# Patient Record
Sex: FEMALE | Race: WHITE | NOT HISPANIC OR LATINO | Employment: FULL TIME | URBAN - NONMETROPOLITAN AREA
[De-identification: names, ages, dates, MRNs, and addresses within clinical notes are randomized per-mention and may not be internally consistent; named-entity substitution may affect disease eponyms.]

---

## 2017-08-22 ENCOUNTER — APPOINTMENT (OUTPATIENT)
Dept: CT IMAGING | Facility: HOSPITAL | Age: 32
End: 2017-08-22

## 2017-08-22 ENCOUNTER — HOSPITAL ENCOUNTER (EMERGENCY)
Facility: HOSPITAL | Age: 32
Discharge: HOME | End: 2017-08-22
Attending: EMERGENCY MEDICINE | Admitting: EMERGENCY MEDICINE

## 2017-08-22 VITALS
WEIGHT: 150 LBS | DIASTOLIC BLOOD PRESSURE: 76 MMHG | OXYGEN SATURATION: 99 % | RESPIRATION RATE: 18 BRPM | HEART RATE: 68 BPM | BODY MASS INDEX: 22.22 KG/M2 | SYSTOLIC BLOOD PRESSURE: 118 MMHG | HEIGHT: 69 IN | TEMPERATURE: 98 F

## 2017-08-22 DIAGNOSIS — R10.31 GROIN PAIN, RIGHT: Primary | ICD-10-CM

## 2017-08-22 LAB
B-HCG UR QL: NEGATIVE
BILIRUB UR QL STRIP: NEGATIVE
CLARITY UR: ABNORMAL
COLOR UR: YELLOW
GLUCOSE UR STRIP-MCNC: NEGATIVE MG/DL
HGB UR QL STRIP.AUTO: NEGATIVE
KETONES UR QL STRIP: NEGATIVE
LEUKOCYTE ESTERASE UR QL STRIP.AUTO: NEGATIVE
NITRITE UR QL STRIP: NEGATIVE
PH UR STRIP.AUTO: 8 [PH] (ref 5–8)
PROT UR QL STRIP: NEGATIVE
SP GR UR STRIP: 1.02 (ref 1–1.03)
UROBILINOGEN UR QL STRIP: ABNORMAL

## 2017-08-22 PROCEDURE — 74176 CT ABD & PELVIS W/O CONTRAST: CPT | Performed by: RADIOLOGY

## 2017-08-22 PROCEDURE — 81003 URINALYSIS AUTO W/O SCOPE: CPT | Performed by: PHYSICIAN ASSISTANT

## 2017-08-22 PROCEDURE — 74176 CT ABD & PELVIS W/O CONTRAST: CPT

## 2017-08-22 PROCEDURE — 99283 EMERGENCY DEPT VISIT LOW MDM: CPT

## 2017-08-22 PROCEDURE — 81025 URINE PREGNANCY TEST: CPT | Performed by: PHYSICIAN ASSISTANT

## 2017-08-22 RX ORDER — CYCLOBENZAPRINE HCL 10 MG
10 TABLET ORAL 2 TIMES DAILY PRN
Qty: 30 TABLET | Refills: 0 | OUTPATIENT
Start: 2017-08-22 | End: 2019-09-11

## 2017-08-22 NOTE — ED PROVIDER NOTES
Subjective   HPI Comments: 31 yo WF, presented to ER w/ two day hx of groin pain.  Pt admitted to having inguinal hernia repair 8 yrs PTA by Dr. Weiss.  Pt admitted that over the last two days having pressure like pain at site of repair, which is on the right side of her groin.  Pt denied NV, or issues w/ bowel or bladder.  Pt is able to reproduce pain after sitting for long periods.  Pt admitted to stopping Sprintec about one month ago, but denied any hx of ovarian cysts.       Review of Systems   Constitutional: Negative.  Negative for fever.   HENT: Negative.    Respiratory: Negative.    Cardiovascular: Negative.  Negative for chest pain.   Gastrointestinal: Negative.  Negative for abdominal pain.   Endocrine: Negative.    Genitourinary: Negative.  Negative for dysuria.   Skin: Negative.    Neurological: Negative.    Psychiatric/Behavioral: Negative.    All other systems reviewed and are negative.      Past Medical History:   Diagnosis Date   • Anxiety    • Migraine headache    • Nongonococcal urethritis due to chlamydia trachomatis    • Syncope        Allergies   Allergen Reactions   • Escitalopram    • Paroxetine        Past Surgical History:   Procedure Laterality Date   •  SECTION     • DILATATION AND CURETTAGE     • INGUINAL HERNIA REPAIR         Family History   Problem Relation Age of Onset   • Migraines Mother    • Atrial fibrillation Father    • Diabetes Father    • Breast cancer Paternal Grandmother        Social History     Social History   • Marital status:      Spouse name: N/A   • Number of children: N/A   • Years of education: N/A     Social History Main Topics   • Smoking status: Former Smoker   • Smokeless tobacco: Never Used      Comment: Half a pack a day for 5 years.   • Alcohol use Yes      Comment: Social use   • Drug use: No   • Sexual activity: Yes     Partners: Male     Birth control/ protection: OCP     Other Topics Concern   • None     Social History Narrative   • None            Objective   Physical Exam   Constitutional: She is oriented to person, place, and time. She appears well-developed and well-nourished. No distress.   HENT:   Head: Normocephalic and atraumatic.   Nose: Nose normal.   Eyes: Conjunctivae and EOM are normal. Pupils are equal, round, and reactive to light.   Neck: Normal range of motion. Neck supple. No JVD present. No tracheal deviation present.   Cardiovascular: Normal rate, regular rhythm and normal heart sounds.    No murmur heard.  Pulmonary/Chest: Effort normal and breath sounds normal. No respiratory distress. She has no wheezes.   Abdominal: Soft. Bowel sounds are normal. There is no tenderness.   Right inguinal pain, tender to palpation.    Musculoskeletal: Normal range of motion. She exhibits no edema or deformity.   Neurological: She is alert and oriented to person, place, and time. No cranial nerve deficit.   Skin: Skin is warm and dry. No rash noted. She is not diaphoretic. No erythema. No pallor.   Psychiatric: She has a normal mood and affect. Her behavior is normal. Thought content normal.   Nursing note and vitals reviewed.      Procedures         ED Course  ED Course   Comment By Time   CT Abd Pelvis VRAD:  No explanation for the pain seen unless related to constipation or recent ovulation. EARLINE Sands 08/22 1813                  MDM  Number of Diagnoses or Management Options  new and requires workup     Amount and/or Complexity of Data Reviewed  Clinical lab tests: ordered and reviewed  Tests in the radiology section of CPT®: ordered and reviewed    Risk of Complications, Morbidity, and/or Mortality  Presenting problems: low  Diagnostic procedures: low  Management options: low    Patient Progress  Patient progress: stable      Final diagnoses:   Groin pain, right            EARLINE Sands  08/22/17 1817

## 2018-07-11 ENCOUNTER — APPOINTMENT (OUTPATIENT)
Dept: GENERAL RADIOLOGY | Facility: HOSPITAL | Age: 33
End: 2018-07-11

## 2018-07-11 ENCOUNTER — HOSPITAL ENCOUNTER (EMERGENCY)
Facility: HOSPITAL | Age: 33
Discharge: HOME | End: 2018-07-11
Attending: EMERGENCY MEDICINE | Admitting: EMERGENCY MEDICINE

## 2018-07-11 VITALS
BODY MASS INDEX: 22.96 KG/M2 | RESPIRATION RATE: 18 BRPM | SYSTOLIC BLOOD PRESSURE: 112 MMHG | TEMPERATURE: 97.3 F | HEART RATE: 73 BPM | DIASTOLIC BLOOD PRESSURE: 67 MMHG | HEIGHT: 69 IN | OXYGEN SATURATION: 100 % | WEIGHT: 155 LBS

## 2018-07-11 DIAGNOSIS — T18.108A ESOPHAGEAL FOREIGN BODY, INITIAL ENCOUNTER: Primary | ICD-10-CM

## 2018-07-11 LAB
ALBUMIN SERPL-MCNC: 4.7 G/DL (ref 3.5–5)
ALBUMIN/GLOB SERPL: 1.3 G/DL (ref 1.5–2.5)
ALP SERPL-CCNC: 55 U/L (ref 35–104)
ALT SERPL W P-5'-P-CCNC: 15 U/L (ref 10–36)
ANION GAP SERPL CALCULATED.3IONS-SCNC: 7.2 MMOL/L (ref 3.6–11.2)
AST SERPL-CCNC: 23 U/L (ref 10–30)
BASOPHILS # BLD AUTO: 0.01 10*3/MM3 (ref 0–0.3)
BASOPHILS NFR BLD AUTO: 0.1 % (ref 0–2)
BILIRUB SERPL-MCNC: 0.6 MG/DL (ref 0.2–1.8)
BUN BLD-MCNC: 10 MG/DL (ref 7–21)
BUN/CREAT SERPL: 15.2 (ref 7–25)
CALCIUM SPEC-SCNC: 9 MG/DL (ref 7.7–10)
CHLORIDE SERPL-SCNC: 107 MMOL/L (ref 99–112)
CO2 SERPL-SCNC: 23.8 MMOL/L (ref 24.3–31.9)
CREAT BLD-MCNC: 0.66 MG/DL (ref 0.43–1.29)
DEPRECATED RDW RBC AUTO: 43 FL (ref 37–54)
EOSINOPHIL # BLD AUTO: 0.06 10*3/MM3 (ref 0–0.7)
EOSINOPHIL NFR BLD AUTO: 0.7 % (ref 0–5)
ERYTHROCYTE [DISTWIDTH] IN BLOOD BY AUTOMATED COUNT: 13.1 % (ref 11.5–14.5)
GFR SERPL CREATININE-BSD FRML MDRD: 103 ML/MIN/1.73
GLOBULIN UR ELPH-MCNC: 3.5 GM/DL
GLUCOSE BLD-MCNC: 91 MG/DL (ref 70–110)
HCT VFR BLD AUTO: 39.3 % (ref 37–47)
HGB BLD-MCNC: 13.9 G/DL (ref 12–16)
IMM GRANULOCYTES # BLD: 0.02 10*3/MM3 (ref 0–0.03)
IMM GRANULOCYTES NFR BLD: 0.2 % (ref 0–0.5)
LYMPHOCYTES # BLD AUTO: 1.5 10*3/MM3 (ref 1–3)
LYMPHOCYTES NFR BLD AUTO: 17 % (ref 21–51)
MCH RBC QN AUTO: 32.6 PG (ref 27–33)
MCHC RBC AUTO-ENTMCNC: 35.4 G/DL (ref 33–37)
MCV RBC AUTO: 92.3 FL (ref 80–94)
MONOCYTES # BLD AUTO: 0.48 10*3/MM3 (ref 0.1–0.9)
MONOCYTES NFR BLD AUTO: 5.4 % (ref 0–10)
NEUTROPHILS # BLD AUTO: 6.76 10*3/MM3 (ref 1.4–6.5)
NEUTROPHILS NFR BLD AUTO: 76.6 % (ref 30–70)
OSMOLALITY SERPL CALC.SUM OF ELEC: 274.3 MOSM/KG (ref 273–305)
PLATELET # BLD AUTO: 247 10*3/MM3 (ref 130–400)
PMV BLD AUTO: 10.3 FL (ref 6–10)
POTASSIUM BLD-SCNC: 4.2 MMOL/L (ref 3.5–5.3)
PROT SERPL-MCNC: 8.2 G/DL (ref 6–8)
RBC # BLD AUTO: 4.26 10*6/MM3 (ref 4.2–5.4)
SODIUM BLD-SCNC: 138 MMOL/L (ref 135–153)
WBC NRBC COR # BLD: 8.83 10*3/MM3 (ref 4.5–12.5)

## 2018-07-11 PROCEDURE — 70360 X-RAY EXAM OF NECK: CPT | Performed by: RADIOLOGY

## 2018-07-11 PROCEDURE — 25010000002 GLUCAGON (HUMAN RECOMBINANT) 1 MG RECONSTITUTED SOLUTION: Performed by: PHYSICIAN ASSISTANT

## 2018-07-11 PROCEDURE — 70360 X-RAY EXAM OF NECK: CPT

## 2018-07-11 PROCEDURE — 99283 EMERGENCY DEPT VISIT LOW MDM: CPT

## 2018-07-11 PROCEDURE — 96374 THER/PROPH/DIAG INJ IV PUSH: CPT

## 2018-07-11 PROCEDURE — 80053 COMPREHEN METABOLIC PANEL: CPT | Performed by: PHYSICIAN ASSISTANT

## 2018-07-11 PROCEDURE — 85025 COMPLETE CBC W/AUTO DIFF WBC: CPT | Performed by: PHYSICIAN ASSISTANT

## 2018-07-11 RX ORDER — SODIUM CHLORIDE 0.9 % (FLUSH) 0.9 %
10 SYRINGE (ML) INJECTION AS NEEDED
Status: DISCONTINUED | OUTPATIENT
Start: 2018-07-11 | End: 2018-07-11 | Stop reason: HOSPADM

## 2018-07-11 RX ADMIN — SODIUM CHLORIDE 1000 ML: 9 INJECTION, SOLUTION INTRAVENOUS at 11:49

## 2018-07-11 RX ADMIN — GLUCAGON HYDROCHLORIDE 1 MG: KIT at 11:49

## 2018-07-11 NOTE — ED PROVIDER NOTES
"Subjective   This is a 33-year-old female comes in with chief complaint \"food stuck in my throat\" just prior to arrival.  Patient states she was eating chicken pasta that she maintain her house.  States that she felt like food is stuck in her throat or esophagus.  Patient does state she's been a little drink fluids and keep it down.  Patient denies any vomiting but has had nausea.  Patient has not tried to eat anything since consuming the food.  Patient denies any previous history of esophageal foreign bodies.        History provided by:  Patient   used: No    Foreign Body   Location:  Swallowed  Suspected object:  Food  Pain quality:  Aching  Pain severity:  Moderate  Duration:  1 day  Timing:  Intermittent  Progression:  Worsening  Chronicity:  New  Worsened by:  Nothing  Ineffective treatments:  None tried  Associated symptoms: nausea    Associated symptoms: no abdominal pain, no choking, no congestion, no cough, no difficulty breathing, no drooling, no ear pain, no hearing loss, no nasal discharge, no rectal bleeding, no rectal pain, no rhinorrhea, no trouble swallowing and no vaginal discharge        Review of Systems   HENT: Negative for congestion, drooling, ear pain, hearing loss, rhinorrhea and trouble swallowing.    Eyes: Negative for pain and redness.   Respiratory: Negative for cough and choking.    Gastrointestinal: Positive for nausea. Negative for abdominal pain and rectal pain.   Genitourinary: Negative for frequency, genital sores and vaginal discharge.   All other systems reviewed and are negative.      Past Medical History:   Diagnosis Date   • Anxiety    • Migraine headache    • Nongonococcal urethritis due to chlamydia trachomatis    • Syncope        Allergies   Allergen Reactions   • Escitalopram    • Paroxetine        Past Surgical History:   Procedure Laterality Date   •  SECTION     • DILATATION AND CURETTAGE     • INGUINAL HERNIA REPAIR         Family History "   Problem Relation Age of Onset   • Migraines Mother    • Atrial fibrillation Father    • Diabetes Father    • Breast cancer Paternal Grandmother        Social History     Social History   • Marital status:      Social History Main Topics   • Smoking status: Former Smoker   • Smokeless tobacco: Never Used      Comment: Half a pack a day for 5 years.   • Alcohol use Yes      Comment: Social use   • Drug use: No   • Sexual activity: Yes     Partners: Male     Birth control/ protection: OCP     Other Topics Concern   • Not on file           Objective   Physical Exam   Constitutional: She is oriented to person, place, and time. She appears well-developed and well-nourished.   HENT:   Head: Normocephalic.   Right Ear: External ear normal.   Left Ear: External ear normal.   Nose: Nose normal.   Mouth/Throat: Oropharynx is clear and moist. No oropharyngeal exudate.   Eyes: Pupils are equal, round, and reactive to light. Conjunctivae and EOM are normal. Right eye exhibits no discharge. Left eye exhibits no discharge.   Neck: Normal range of motion. Neck supple. No JVD present. No tracheal deviation present. No thyromegaly present.   Cardiovascular: Normal rate, regular rhythm, normal heart sounds and intact distal pulses.  Exam reveals no friction rub.    No murmur heard.  Pulmonary/Chest: Effort normal and breath sounds normal. No stridor. No respiratory distress. She has no wheezes. She has no rales.   Abdominal: Soft. Bowel sounds are normal. She exhibits no distension and no mass. There is no tenderness. There is no guarding.   Musculoskeletal: Normal range of motion. She exhibits no edema, tenderness or deformity.   Neurological: She is alert and oriented to person, place, and time. She has normal reflexes. She displays normal reflexes. No cranial nerve deficit. Coordination normal.   Skin: Skin is warm and dry. Capillary refill takes less than 2 seconds. No rash noted. No erythema. No pallor.   Psychiatric: She  has a normal mood and affect. Her behavior is normal. Judgment and thought content normal.   Vitals reviewed.      Procedures           ED Course  ED Course as of Jul 24 1054   Wed Jul 11, 2018   1357 Patient states that she fells better at this time. Does not have sensation of foreign body at this time. Will be discharged.   []      ED Course User Index  [] Shane Smith PA-C                  [facility]      Final diagnoses:   Esophageal foreign body, initial encounter            Shane Smith PA-C  07/11/18 1358       Shane Smith PA-C  07/24/18 1054

## 2019-09-11 ENCOUNTER — HOSPITAL ENCOUNTER (EMERGENCY)
Facility: HOSPITAL | Age: 34
Discharge: HOME | End: 2019-09-11
Attending: EMERGENCY MEDICINE | Admitting: EMERGENCY MEDICINE

## 2019-09-11 ENCOUNTER — APPOINTMENT (OUTPATIENT)
Dept: GENERAL RADIOLOGY | Facility: HOSPITAL | Age: 34
End: 2019-09-11

## 2019-09-11 ENCOUNTER — APPOINTMENT (OUTPATIENT)
Dept: ULTRASOUND IMAGING | Facility: HOSPITAL | Age: 34
End: 2019-09-11

## 2019-09-11 VITALS
WEIGHT: 155 LBS | DIASTOLIC BLOOD PRESSURE: 73 MMHG | RESPIRATION RATE: 18 BRPM | BODY MASS INDEX: 22.96 KG/M2 | HEART RATE: 76 BPM | SYSTOLIC BLOOD PRESSURE: 121 MMHG | HEIGHT: 69 IN | OXYGEN SATURATION: 98 % | TEMPERATURE: 98.2 F

## 2019-09-11 DIAGNOSIS — K58.0 IRRITABLE BOWEL SYNDROME WITH DIARRHEA: Primary | ICD-10-CM

## 2019-09-11 LAB
ALBUMIN SERPL-MCNC: 4.7 G/DL (ref 3.5–5.2)
ALBUMIN/GLOB SERPL: 1.2 G/DL
ALP SERPL-CCNC: 67 U/L (ref 39–117)
ALT SERPL W P-5'-P-CCNC: 18 U/L (ref 1–33)
ANION GAP SERPL CALCULATED.3IONS-SCNC: 12 MMOL/L (ref 5–15)
AST SERPL-CCNC: 28 U/L (ref 1–32)
B-HCG UR QL: NEGATIVE
BASOPHILS # BLD AUTO: 0.01 10*3/MM3 (ref 0–0.2)
BASOPHILS NFR BLD AUTO: 0.2 % (ref 0–1.5)
BILIRUB SERPL-MCNC: 0.7 MG/DL (ref 0.2–1.2)
BILIRUB UR QL STRIP: NEGATIVE
BUN BLD-MCNC: 4 MG/DL (ref 6–20)
BUN/CREAT SERPL: 6.3 (ref 7–25)
CALCIUM SPEC-SCNC: 9.7 MG/DL (ref 8.6–10.5)
CHLORIDE SERPL-SCNC: 102 MMOL/L (ref 98–107)
CLARITY UR: CLEAR
CO2 SERPL-SCNC: 25 MMOL/L (ref 22–29)
COLOR UR: YELLOW
CREAT BLD-MCNC: 0.63 MG/DL (ref 0.57–1)
DEPRECATED RDW RBC AUTO: 50.6 FL (ref 37–54)
EOSINOPHIL # BLD AUTO: 0.08 10*3/MM3 (ref 0–0.4)
EOSINOPHIL NFR BLD AUTO: 1.8 % (ref 0.3–6.2)
ERYTHROCYTE [DISTWIDTH] IN BLOOD BY AUTOMATED COUNT: 13.9 % (ref 12.3–15.4)
GFR SERPL CREATININE-BSD FRML MDRD: 108 ML/MIN/1.73
GLOBULIN UR ELPH-MCNC: 3.8 GM/DL
GLUCOSE BLD-MCNC: 95 MG/DL (ref 65–99)
GLUCOSE UR STRIP-MCNC: NEGATIVE MG/DL
HCT VFR BLD AUTO: 38.5 % (ref 34–46.6)
HGB BLD-MCNC: 13.3 G/DL (ref 12–15.9)
HGB UR QL STRIP.AUTO: NEGATIVE
IMM GRANULOCYTES # BLD AUTO: 0.01 10*3/MM3 (ref 0–0.05)
IMM GRANULOCYTES NFR BLD AUTO: 0.2 % (ref 0–0.5)
KETONES UR QL STRIP: NEGATIVE
LEUKOCYTE ESTERASE UR QL STRIP.AUTO: NEGATIVE
LIPASE SERPL-CCNC: 35 U/L (ref 13–60)
LYMPHOCYTES # BLD AUTO: 1.2 10*3/MM3 (ref 0.7–3.1)
LYMPHOCYTES NFR BLD AUTO: 26.8 % (ref 19.6–45.3)
MCH RBC QN AUTO: 34.5 PG (ref 26.6–33)
MCHC RBC AUTO-ENTMCNC: 34.5 G/DL (ref 31.5–35.7)
MCV RBC AUTO: 100 FL (ref 79–97)
MONOCYTES # BLD AUTO: 0.42 10*3/MM3 (ref 0.1–0.9)
MONOCYTES NFR BLD AUTO: 9.4 % (ref 5–12)
NEUTROPHILS # BLD AUTO: 2.76 10*3/MM3 (ref 1.7–7)
NEUTROPHILS NFR BLD AUTO: 61.6 % (ref 42.7–76)
NITRITE UR QL STRIP: NEGATIVE
PH UR STRIP.AUTO: 7.5 [PH] (ref 5–8)
PLATELET # BLD AUTO: 223 10*3/MM3 (ref 140–450)
PMV BLD AUTO: 10.5 FL (ref 6–12)
POTASSIUM BLD-SCNC: 4.1 MMOL/L (ref 3.5–5.2)
PROT SERPL-MCNC: 8.5 G/DL (ref 6–8.5)
PROT UR QL STRIP: NEGATIVE
RBC # BLD AUTO: 3.85 10*6/MM3 (ref 3.77–5.28)
SODIUM BLD-SCNC: 139 MMOL/L (ref 136–145)
SP GR UR STRIP: <=1.005 (ref 1–1.03)
UROBILINOGEN UR QL STRIP: NORMAL
WBC NRBC COR # BLD: 4.48 10*3/MM3 (ref 3.4–10.8)

## 2019-09-11 PROCEDURE — 76705 ECHO EXAM OF ABDOMEN: CPT | Performed by: RADIOLOGY

## 2019-09-11 PROCEDURE — 74022 RADEX COMPL AQT ABD SERIES: CPT | Performed by: RADIOLOGY

## 2019-09-11 PROCEDURE — 76705 ECHO EXAM OF ABDOMEN: CPT

## 2019-09-11 PROCEDURE — 81025 URINE PREGNANCY TEST: CPT | Performed by: EMERGENCY MEDICINE

## 2019-09-11 PROCEDURE — 85025 COMPLETE CBC W/AUTO DIFF WBC: CPT | Performed by: EMERGENCY MEDICINE

## 2019-09-11 PROCEDURE — 81003 URINALYSIS AUTO W/O SCOPE: CPT | Performed by: EMERGENCY MEDICINE

## 2019-09-11 PROCEDURE — 99283 EMERGENCY DEPT VISIT LOW MDM: CPT

## 2019-09-11 PROCEDURE — 83690 ASSAY OF LIPASE: CPT | Performed by: EMERGENCY MEDICINE

## 2019-09-11 PROCEDURE — 80053 COMPREHEN METABOLIC PANEL: CPT | Performed by: EMERGENCY MEDICINE

## 2019-09-11 PROCEDURE — 74022 RADEX COMPL AQT ABD SERIES: CPT

## 2019-09-11 RX ORDER — SODIUM CHLORIDE 0.9 % (FLUSH) 0.9 %
10 SYRINGE (ML) INJECTION AS NEEDED
Status: DISCONTINUED | OUTPATIENT
Start: 2019-09-11 | End: 2019-09-11 | Stop reason: HOSPADM

## 2019-09-11 NOTE — ED PROVIDER NOTES
Subjective   34-year-old-female complains of abdominal pain.  Patient describes a 4 to 6-week history of abdominal pain.  Primarily this is cramping pain, usually in the right upper quadrant.  She states that this is worse after eating.  After eating she also feels that she needs to immediately have a bowel movement, which relieves this pain.  She has a history of IBS in the past and currently is not taking any medicine for that.  She denies any nausea, vomiting, fever, chills, dysuria, hematuria, weight loss or other complaints.            Review of Systems   All other systems reviewed and are negative.      Past Medical History:   Diagnosis Date   • Anxiety    • Migraine headache    • Nongonococcal urethritis due to chlamydia trachomatis    • Syncope        Allergies   Allergen Reactions   • Escitalopram    • Paroxetine        Past Surgical History:   Procedure Laterality Date   •  SECTION     • DILATATION AND CURETTAGE     • INGUINAL HERNIA REPAIR         Family History   Problem Relation Age of Onset   • Migraines Mother    • Atrial fibrillation Father    • Diabetes Father    • Breast cancer Paternal Grandmother        Social History     Socioeconomic History   • Marital status:      Spouse name: Not on file   • Number of children: Not on file   • Years of education: Not on file   • Highest education level: Not on file   Tobacco Use   • Smoking status: Former Smoker   • Smokeless tobacco: Never Used   • Tobacco comment: Half a pack a day for 5 years.   Substance and Sexual Activity   • Alcohol use: Yes     Comment: Social use   • Drug use: No   • Sexual activity: Yes     Partners: Male     Birth control/protection: OCP           Objective   Physical Exam   Constitutional: She is oriented to person, place, and time. She appears well-developed and well-nourished.   HENT:   Head: Normocephalic and atraumatic.   Eyes: Conjunctivae are normal.   Cardiovascular: Normal rate, regular rhythm and normal  heart sounds. Exam reveals no gallop and no friction rub.   No murmur heard.  Pulmonary/Chest: Effort normal and breath sounds normal. She has no wheezes. She has no rhonchi. She has no rales.   Abdominal: Normal appearance and bowel sounds are normal. There is tenderness (Mild) in the right upper quadrant.   Musculoskeletal: Normal range of motion. She exhibits no edema.   Neurological: She is alert and oriented to person, place, and time.   Skin: Skin is warm and dry.   Psychiatric: She has a normal mood and affect.   Nursing note and vitals reviewed.      Procedures  Results for orders placed or performed during the hospital encounter of 09/11/19   Comprehensive Metabolic Panel   Result Value Ref Range    Glucose 95 65 - 99 mg/dL    BUN 4 (L) 6 - 20 mg/dL    Creatinine 0.63 0.57 - 1.00 mg/dL    Sodium 139 136 - 145 mmol/L    Potassium 4.1 3.5 - 5.2 mmol/L    Chloride 102 98 - 107 mmol/L    CO2 25.0 22.0 - 29.0 mmol/L    Calcium 9.7 8.6 - 10.5 mg/dL    Total Protein 8.5 6.0 - 8.5 g/dL    Albumin 4.70 3.50 - 5.20 g/dL    ALT (SGPT) 18 1 - 33 U/L    AST (SGOT) 28 1 - 32 U/L    Alkaline Phosphatase 67 39 - 117 U/L    Total Bilirubin 0.7 0.2 - 1.2 mg/dL    eGFR Non African Amer 108 >60 mL/min/1.73    Globulin 3.8 gm/dL    A/G Ratio 1.2 g/dL    BUN/Creatinine Ratio 6.3 (L) 7.0 - 25.0    Anion Gap 12.0 5.0 - 15.0 mmol/L   Urinalysis With Culture If Indicated - Urine, Clean Catch   Result Value Ref Range    Color, UA Yellow Yellow, Straw    Appearance, UA Clear Clear    pH, UA 7.5 5.0 - 8.0    Specific Gravity, UA <=1.005 1.005 - 1.030    Glucose, UA Negative Negative    Ketones, UA Negative Negative    Bilirubin, UA Negative Negative    Blood, UA Negative Negative    Protein, UA Negative Negative    Leuk Esterase, UA Negative Negative    Nitrite, UA Negative Negative    Urobilinogen, UA 0.2 E.U./dL 0.2 - 1.0 E.U./dL   Lipase   Result Value Ref Range    Lipase 35 13 - 60 U/L   Pregnancy, Urine - Urine, Clean Catch    Result Value Ref Range    HCG, Urine QL Negative Negative   CBC Auto Differential   Result Value Ref Range    WBC 4.48 3.40 - 10.80 10*3/mm3    RBC 3.85 3.77 - 5.28 10*6/mm3    Hemoglobin 13.3 12.0 - 15.9 g/dL    Hematocrit 38.5 34.0 - 46.6 %    .0 (H) 79.0 - 97.0 fL    MCH 34.5 (H) 26.6 - 33.0 pg    MCHC 34.5 31.5 - 35.7 g/dL    RDW 13.9 12.3 - 15.4 %    RDW-SD 50.6 37.0 - 54.0 fl    MPV 10.5 6.0 - 12.0 fL    Platelets 223 140 - 450 10*3/mm3    Neutrophil % 61.6 42.7 - 76.0 %    Lymphocyte % 26.8 19.6 - 45.3 %    Monocyte % 9.4 5.0 - 12.0 %    Eosinophil % 1.8 0.3 - 6.2 %    Basophil % 0.2 0.0 - 1.5 %    Immature Grans % 0.2 0.0 - 0.5 %    Neutrophils, Absolute 2.76 1.70 - 7.00 10*3/mm3    Lymphocytes, Absolute 1.20 0.70 - 3.10 10*3/mm3    Monocytes, Absolute 0.42 0.10 - 0.90 10*3/mm3    Eosinophils, Absolute 0.08 0.00 - 0.40 10*3/mm3    Basophils, Absolute 0.01 0.00 - 0.20 10*3/mm3    Immature Grans, Absolute 0.01 0.00 - 0.05 10*3/mm3     Xr Abdomen 2 View With Chest 1 View    Result Date: 9/11/2019  Narrative: EXAMINATION: XR ABDOMEN 2 VW W CHEST 1 VW-  One frontal view of the chest  and two frontal views of the abdomen.  CLINICAL INDICATION:     abd pain  COMPARISON: NONE  FINDINGS: The cardiac silhouette is normal in size and configuration. The lungs are aerated. There is no pleural fluid. No free air beneath the diaphragm. Supine and upright views of the abdomen show nonspecific bowel gas pattern. There is no evidence of bowel obstruction. No intra-abdominal  soft tissue masses are demonstrated. No pathological calcifications are identified.      Impression: Unremarkable exam.  This report was finalized on 9/11/2019 10:18 AM by Dr. Ted Lacy MD.      Us Gallbladder    Result Date: 9/11/2019  Narrative: EXAMINATION: US GALLBLADDER-   CLINICAL INDICATION:     RUQ pain  TECHNIQUE: Multiplanar gray scale ultrasound of the abdomen.  COMPARISON: NONE  FINDINGS: Visualized pancreas is unremarkable. The  gallbladder is unremarkable without stones or wall thickening. The CBD measures 4 mm. The liver demonstrates normal echogenicity without focal lesion.  No ascites demonstrated. Technologist reports positive sonographic Montes sign.      Impression: 1. Unremarkable sonographic appearance of gallbladder. 2. Technologist reports positive sonographic Montes sign.  This report was finalized on 9/11/2019 10:18 AM by Dr. Ted Lacy MD.               ED Course                  MDM  Number of Diagnoses or Management Options  Irritable bowel syndrome with diarrhea:      Amount and/or Complexity of Data Reviewed  Clinical lab tests: reviewed  Tests in the radiology section of CPT®: reviewed    Risk of Complications, Morbidity, and/or Mortality  Presenting problems: high  Diagnostic procedures: high  Management options: moderate        Final diagnoses:   Irritable bowel syndrome with diarrhea              Amado Molina MD  09/11/19 1210

## 2019-12-28 ENCOUNTER — HOSPITAL ENCOUNTER (EMERGENCY)
Facility: HOSPITAL | Age: 34
Discharge: HOME | End: 2019-12-28
Attending: EMERGENCY MEDICINE | Admitting: EMERGENCY MEDICINE

## 2019-12-28 VITALS
BODY MASS INDEX: 23.7 KG/M2 | TEMPERATURE: 98.1 F | SYSTOLIC BLOOD PRESSURE: 139 MMHG | WEIGHT: 160 LBS | RESPIRATION RATE: 18 BRPM | DIASTOLIC BLOOD PRESSURE: 61 MMHG | OXYGEN SATURATION: 98 % | HEIGHT: 69 IN | HEART RATE: 95 BPM

## 2019-12-28 DIAGNOSIS — K04.7 DENTAL ABSCESS: Primary | ICD-10-CM

## 2019-12-28 LAB
ANION GAP SERPL CALCULATED.3IONS-SCNC: 13.1 MMOL/L (ref 5–15)
BASOPHILS # BLD AUTO: 0.02 10*3/MM3 (ref 0–0.2)
BASOPHILS NFR BLD AUTO: 0.2 % (ref 0–1.5)
BUN BLD-MCNC: 3 MG/DL (ref 6–20)
BUN/CREAT SERPL: 4.9 (ref 7–25)
CALCIUM SPEC-SCNC: 9 MG/DL (ref 8.6–10.5)
CHLORIDE SERPL-SCNC: 101 MMOL/L (ref 98–107)
CO2 SERPL-SCNC: 23.9 MMOL/L (ref 22–29)
CREAT BLD-MCNC: 0.61 MG/DL (ref 0.57–1)
CRP SERPL-MCNC: 2.97 MG/DL (ref 0–0.5)
DEPRECATED RDW RBC AUTO: 47.8 FL (ref 37–54)
EOSINOPHIL # BLD AUTO: 0.23 10*3/MM3 (ref 0–0.4)
EOSINOPHIL NFR BLD AUTO: 2.6 % (ref 0.3–6.2)
ERYTHROCYTE [DISTWIDTH] IN BLOOD BY AUTOMATED COUNT: 13 % (ref 12.3–15.4)
GFR SERPL CREATININE-BSD FRML MDRD: 112 ML/MIN/1.73
GLUCOSE BLD-MCNC: 107 MG/DL (ref 65–99)
HCT VFR BLD AUTO: 37.3 % (ref 34–46.6)
HGB BLD-MCNC: 12.8 G/DL (ref 12–15.9)
IMM GRANULOCYTES # BLD AUTO: 0.02 10*3/MM3 (ref 0–0.05)
IMM GRANULOCYTES NFR BLD AUTO: 0.2 % (ref 0–0.5)
LYMPHOCYTES # BLD AUTO: 2.04 10*3/MM3 (ref 0.7–3.1)
LYMPHOCYTES NFR BLD AUTO: 22.8 % (ref 19.6–45.3)
MCH RBC QN AUTO: 34.3 PG (ref 26.6–33)
MCHC RBC AUTO-ENTMCNC: 34.3 G/DL (ref 31.5–35.7)
MCV RBC AUTO: 100 FL (ref 79–97)
MONOCYTES # BLD AUTO: 0.68 10*3/MM3 (ref 0.1–0.9)
MONOCYTES NFR BLD AUTO: 7.6 % (ref 5–12)
NEUTROPHILS # BLD AUTO: 5.95 10*3/MM3 (ref 1.7–7)
NEUTROPHILS NFR BLD AUTO: 66.6 % (ref 42.7–76)
NRBC BLD AUTO-RTO: 0 /100 WBC (ref 0–0.2)
PLATELET # BLD AUTO: 255 10*3/MM3 (ref 140–450)
PMV BLD AUTO: 10 FL (ref 6–12)
POTASSIUM BLD-SCNC: 3.7 MMOL/L (ref 3.5–5.2)
RBC # BLD AUTO: 3.73 10*6/MM3 (ref 3.77–5.28)
SODIUM BLD-SCNC: 138 MMOL/L (ref 136–145)
WBC NRBC COR # BLD: 8.94 10*3/MM3 (ref 3.4–10.8)

## 2019-12-28 PROCEDURE — 96365 THER/PROPH/DIAG IV INF INIT: CPT

## 2019-12-28 PROCEDURE — 85025 COMPLETE CBC W/AUTO DIFF WBC: CPT | Performed by: NURSE PRACTITIONER

## 2019-12-28 PROCEDURE — 96375 TX/PRO/DX INJ NEW DRUG ADDON: CPT

## 2019-12-28 PROCEDURE — 85651 RBC SED RATE NONAUTOMATED: CPT | Performed by: NURSE PRACTITIONER

## 2019-12-28 PROCEDURE — 86140 C-REACTIVE PROTEIN: CPT | Performed by: NURSE PRACTITIONER

## 2019-12-28 PROCEDURE — 25010000002 KETOROLAC TROMETHAMINE PER 15 MG: Performed by: NURSE PRACTITIONER

## 2019-12-28 PROCEDURE — 99283 EMERGENCY DEPT VISIT LOW MDM: CPT

## 2019-12-28 PROCEDURE — 25010000002 CEFTRIAXONE: Performed by: NURSE PRACTITIONER

## 2019-12-28 PROCEDURE — 80048 BASIC METABOLIC PNL TOTAL CA: CPT | Performed by: NURSE PRACTITIONER

## 2019-12-28 RX ORDER — HYDROCODONE BITARTRATE AND ACETAMINOPHEN 5; 325 MG/1; MG/1
1 TABLET ORAL EVERY 6 HOURS PRN
COMMUNITY
End: 2022-08-17 | Stop reason: ALTCHOICE

## 2019-12-28 RX ORDER — KETOROLAC TROMETHAMINE 30 MG/ML
30 INJECTION, SOLUTION INTRAMUSCULAR; INTRAVENOUS ONCE
Status: COMPLETED | OUTPATIENT
Start: 2019-12-28 | End: 2019-12-28

## 2019-12-28 RX ORDER — CLINDAMYCIN HYDROCHLORIDE 300 MG/1
300 CAPSULE ORAL 4 TIMES DAILY
COMMUNITY
End: 2022-08-17

## 2019-12-28 RX ORDER — SODIUM CHLORIDE 0.9 % (FLUSH) 0.9 %
10 SYRINGE (ML) INJECTION AS NEEDED
Status: DISCONTINUED | OUTPATIENT
Start: 2019-12-28 | End: 2019-12-29 | Stop reason: HOSPADM

## 2019-12-28 RX ADMIN — SODIUM CHLORIDE 1000 ML: 9 INJECTION, SOLUTION INTRAVENOUS at 21:14

## 2019-12-28 RX ADMIN — CEFTRIAXONE 2 G: 2 INJECTION, POWDER, FOR SOLUTION INTRAMUSCULAR; INTRAVENOUS at 22:01

## 2019-12-28 RX ADMIN — KETOROLAC TROMETHAMINE 30 MG: 30 INJECTION, SOLUTION INTRAMUSCULAR at 21:14

## 2019-12-29 LAB — ERYTHROCYTE [SEDIMENTATION RATE] IN BLOOD: 12 MM/HR (ref 0–20)

## 2021-03-18 ENCOUNTER — BULK ORDERING (OUTPATIENT)
Dept: CASE MANAGEMENT | Facility: OTHER | Age: 36
End: 2021-03-18

## 2021-03-18 DIAGNOSIS — Z23 IMMUNIZATION DUE: ICD-10-CM

## 2021-09-01 ENCOUNTER — APPOINTMENT (OUTPATIENT)
Dept: CT IMAGING | Facility: HOSPITAL | Age: 36
End: 2021-09-01

## 2021-09-01 VITALS
HEIGHT: 69 IN | DIASTOLIC BLOOD PRESSURE: 88 MMHG | SYSTOLIC BLOOD PRESSURE: 141 MMHG | OXYGEN SATURATION: 98 % | BODY MASS INDEX: 24.44 KG/M2 | WEIGHT: 165 LBS | RESPIRATION RATE: 20 BRPM | HEART RATE: 108 BPM | TEMPERATURE: 98.9 F

## 2021-09-01 LAB
ALBUMIN SERPL-MCNC: 4.64 G/DL (ref 3.5–5.2)
ALBUMIN/GLOB SERPL: 1.3 G/DL
ALP SERPL-CCNC: 77 U/L (ref 39–117)
ALT SERPL W P-5'-P-CCNC: 13 U/L (ref 1–33)
ANION GAP SERPL CALCULATED.3IONS-SCNC: 10.8 MMOL/L (ref 5–15)
AST SERPL-CCNC: 20 U/L (ref 1–32)
BASOPHILS # BLD AUTO: 0.02 10*3/MM3 (ref 0–0.2)
BASOPHILS NFR BLD AUTO: 0.5 % (ref 0–1.5)
BILIRUB SERPL-MCNC: 0.3 MG/DL (ref 0–1.2)
BUN SERPL-MCNC: 5 MG/DL (ref 6–20)
BUN/CREAT SERPL: 7.4 (ref 7–25)
CALCIUM SPEC-SCNC: 9.9 MG/DL (ref 8.6–10.5)
CHLORIDE SERPL-SCNC: 104 MMOL/L (ref 98–107)
CO2 SERPL-SCNC: 24.2 MMOL/L (ref 22–29)
CREAT SERPL-MCNC: 0.68 MG/DL (ref 0.57–1)
DEPRECATED RDW RBC AUTO: 47.7 FL (ref 37–54)
EOSINOPHIL # BLD AUTO: 0.1 10*3/MM3 (ref 0–0.4)
EOSINOPHIL NFR BLD AUTO: 2.3 % (ref 0.3–6.2)
ERYTHROCYTE [DISTWIDTH] IN BLOOD BY AUTOMATED COUNT: 13.8 % (ref 12.3–15.4)
FLUAV SUBTYP SPEC NAA+PROBE: NOT DETECTED
FLUBV RNA ISLT QL NAA+PROBE: NOT DETECTED
GFR SERPL CREATININE-BSD FRML MDRD: 98 ML/MIN/1.73
GLOBULIN UR ELPH-MCNC: 3.7 GM/DL
GLUCOSE SERPL-MCNC: 106 MG/DL (ref 65–99)
HCG SERPL QL: NEGATIVE
HCT VFR BLD AUTO: 41.1 % (ref 34–46.6)
HGB BLD-MCNC: 13.9 G/DL (ref 12–15.9)
HOLD SPECIMEN: NORMAL
HOLD SPECIMEN: NORMAL
IMM GRANULOCYTES # BLD AUTO: 0.01 10*3/MM3 (ref 0–0.05)
IMM GRANULOCYTES NFR BLD AUTO: 0.2 % (ref 0–0.5)
LYMPHOCYTES # BLD AUTO: 2.12 10*3/MM3 (ref 0.7–3.1)
LYMPHOCYTES NFR BLD AUTO: 49.5 % (ref 19.6–45.3)
MCH RBC QN AUTO: 32 PG (ref 26.6–33)
MCHC RBC AUTO-ENTMCNC: 33.8 G/DL (ref 31.5–35.7)
MCV RBC AUTO: 94.5 FL (ref 79–97)
MONOCYTES # BLD AUTO: 0.29 10*3/MM3 (ref 0.1–0.9)
MONOCYTES NFR BLD AUTO: 6.8 % (ref 5–12)
NEUTROPHILS NFR BLD AUTO: 1.74 10*3/MM3 (ref 1.7–7)
NEUTROPHILS NFR BLD AUTO: 40.7 % (ref 42.7–76)
NRBC BLD AUTO-RTO: 0 /100 WBC (ref 0–0.2)
PLATELET # BLD AUTO: 311 10*3/MM3 (ref 140–450)
PMV BLD AUTO: 10.2 FL (ref 6–12)
POTASSIUM SERPL-SCNC: 4.4 MMOL/L (ref 3.5–5.2)
PROT SERPL-MCNC: 8.3 G/DL (ref 6–8.5)
RBC # BLD AUTO: 4.35 10*6/MM3 (ref 3.77–5.28)
SARS-COV-2 RNA PNL SPEC NAA+PROBE: NOT DETECTED
SODIUM SERPL-SCNC: 139 MMOL/L (ref 136–145)
WBC # BLD AUTO: 4.28 10*3/MM3 (ref 3.4–10.8)
WHOLE BLOOD HOLD SPECIMEN: NORMAL
WHOLE BLOOD HOLD SPECIMEN: NORMAL

## 2021-09-01 PROCEDURE — 85025 COMPLETE CBC W/AUTO DIFF WBC: CPT | Performed by: NURSE PRACTITIONER

## 2021-09-01 PROCEDURE — 80053 COMPREHEN METABOLIC PANEL: CPT | Performed by: NURSE PRACTITIONER

## 2021-09-01 PROCEDURE — 84703 CHORIONIC GONADOTROPIN ASSAY: CPT | Performed by: NURSE PRACTITIONER

## 2021-09-01 PROCEDURE — 99283 EMERGENCY DEPT VISIT LOW MDM: CPT

## 2021-09-01 PROCEDURE — 70450 CT HEAD/BRAIN W/O DYE: CPT

## 2021-09-01 PROCEDURE — 87636 SARSCOV2 & INF A&B AMP PRB: CPT | Performed by: NURSE PRACTITIONER

## 2021-09-01 RX ORDER — PROCHLORPERAZINE MALEATE 10 MG
10 TABLET ORAL ONCE
Status: COMPLETED | OUTPATIENT
Start: 2021-09-01 | End: 2021-09-02

## 2021-09-01 RX ORDER — DIPHENHYDRAMINE HCL 25 MG
50 CAPSULE ORAL ONCE
Status: DISCONTINUED | OUTPATIENT
Start: 2021-09-01 | End: 2021-09-02 | Stop reason: HOSPADM

## 2021-09-02 ENCOUNTER — HOSPITAL ENCOUNTER (EMERGENCY)
Facility: HOSPITAL | Age: 36
Discharge: HOME | End: 2021-09-02
Attending: STUDENT IN AN ORGANIZED HEALTH CARE EDUCATION/TRAINING PROGRAM | Admitting: STUDENT IN AN ORGANIZED HEALTH CARE EDUCATION/TRAINING PROGRAM

## 2021-09-02 DIAGNOSIS — G43.909 MIGRAINE WITHOUT STATUS MIGRAINOSUS, NOT INTRACTABLE, UNSPECIFIED MIGRAINE TYPE: Primary | ICD-10-CM

## 2021-09-02 PROCEDURE — 63710000001 PROCHLORPERAZINE MALEATE PER 10 MG: Performed by: STUDENT IN AN ORGANIZED HEALTH CARE EDUCATION/TRAINING PROGRAM

## 2021-09-02 RX ADMIN — PROCHLORPERAZINE MALEATE 10 MG: 10 TABLET ORAL at 01:17

## 2021-09-02 NOTE — ED PROVIDER NOTES
[facility]  emergency department encounter        Pt Name: Coco Mahoney  MRN: [id number]  Birthdate 1985  Date of evaluation: 2021    Chief Complaint   Patient presents with   • Headache   • Loss of Vision             HISTORY OF PRESENT ILLNESS:   Coco Mahoney is a 36 y.o. female PMH migraines, anxiety presents complaining of headache ongoing for 2 days.  Patient reports intermittent blindness with her headaches but no vision loss at this moment.  Pain is predominantly left parietal/occipital.  Constant, progressively worsening over 2 days.  Patient is prescribed by her primary care provider Nurtec which usually resolves the migraines but has had no effect currently.  Also prescribed rizatriptan without beneficial effect.  Patient does not know if she is ever had cranial imaging, last CT here was 7 years ago, obtained for syncope.  Patient endorses additional nausea and vomiting.  Patient does not have a neurologist.    No other exacerbating or alleviating factors other than as noted above.  Severity: Severe    PCP: Mony Manriquez MD          REVIEW OF SYSTEMS:     Review of Systems   Constitutional: Negative for fever.   HENT: Negative for congestion and rhinorrhea.    Respiratory: Negative for cough and shortness of breath.    Cardiovascular: Negative for chest pain.   Gastrointestinal: Positive for nausea and vomiting. Negative for abdominal pain.   Genitourinary: Negative for dysuria.   Musculoskeletal: Negative for myalgias.   Skin: Negative for rash.   Neurological: Positive for headaches.   Psychiatric/Behavioral: Negative for confusion.       Review of systems otherwise as per HPI.          PREVIOUS HISTORY:         Past Medical History:   Diagnosis Date   • Anxiety    • Migraine headache    • Nongonococcal urethritis due to chlamydia trachomatis    • Syncope            Past Surgical History:   Procedure Laterality Date   •  SECTION     • DILATATION AND CURETTAGE      • INGUINAL HERNIA REPAIR             Social History     Socioeconomic History   • Marital status:      Spouse name: Not on file   • Number of children: Not on file   • Years of education: Not on file   • Highest education level: Not on file   Tobacco Use   • Smoking status: Former Smoker   • Smokeless tobacco: Never Used   • Tobacco comment: Half a pack a day for 5 years.   Substance and Sexual Activity   • Alcohol use: Yes     Comment: Social use   • Drug use: No   • Sexual activity: Yes     Partners: Male     Birth control/protection: OCP           Family History   Problem Relation Age of Onset   • Migraines Mother    • Atrial fibrillation Father    • Diabetes Father    • Breast cancer Paternal Grandmother          Current Outpatient Medications   Medication Instructions   • clindamycin (CLEOCIN) 300 mg, Oral, 4 Times Daily   • HYDROcodone-acetaminophen (NORCO) 5-325 MG per tablet 1 tablet, Oral, Every 6 Hours PRN   • hyoscyamine (LEVSIN) 0.125 mg, Oral, Every 6 Hours PRN   • LORazepam (ATIVAN) 1 mg, Oral, As Needed   • norgestimate-ethinyl estradiol (SPRINTEC 28) 0.25-35 MG-MCG per tablet Oral   • SUMAtriptan (IMITREX) 100 MG tablet Take 1/2 tablet PO as needed for migraine headache.  May repeat if needed in 2 hours.  Max dose 200 mg daily.          Allergies:  Escitalopram and Paroxetine    Medications, allergies and past medical, surgical, family, and social history reviewed.        PHYSICAL EXAM:     Physical Exam  Vitals and nursing note reviewed.   Constitutional:       General: She is not in acute distress.  HENT:      Head: Normocephalic and atraumatic.   Eyes:      Extraocular Movements: Extraocular movements intact.      Conjunctiva/sclera: Conjunctivae normal.      Pupils: Pupils are equal, round, and reactive to light.      Comments: Nipples equal round reactive to light, no visual deficit appreciated   Cardiovascular:      Rate and Rhythm: Regular rhythm. Tachycardia present.   Pulmonary:       Effort: Pulmonary effort is normal. No respiratory distress.   Abdominal:      General: Abdomen is flat. There is no distension.   Musculoskeletal:         General: No deformity. Normal range of motion.      Cervical back: Normal range of motion.   Skin:     General: Skin is warm.   Neurological:      General: No focal deficit present.      Mental Status: She is alert and oriented to person, place, and time.   Psychiatric:         Mood and Affect: Mood normal.         Behavior: Behavior normal.             COMPLETED DIAGNOSTIC STUDIES AND INTERVENTIONS:     Lab Results (last 24 hours)     Procedure Component Value Units Date/Time    CBC & Differential [[id number]]  (Abnormal) Collected: 09/01/21 2022    Specimen: Blood Updated: 09/01/21 2042    Narrative:      The following orders were created for panel order CBC & Differential.  Procedure                               Abnormality         Status                     ---------                               -----------         ------                     CBC Auto Differential[[id number]]        Abnormal            Final result                 Please view results for these tests on the individual orders.    Comprehensive Metabolic Panel [[id number]]  (Abnormal) Collected: 09/01/21 2022    Specimen: Blood Updated: 09/01/21 2059     Glucose 106 mg/dL      BUN 5 mg/dL      Creatinine 0.68 mg/dL      Sodium 139 mmol/L      Potassium 4.4 mmol/L      Chloride 104 mmol/L      CO2 24.2 mmol/L      Calcium 9.9 mg/dL      Total Protein 8.3 g/dL      Albumin 4.64 g/dL      ALT (SGPT) 13 U/L      AST (SGOT) 20 U/L      Alkaline Phosphatase 77 U/L      Total Bilirubin 0.3 mg/dL      eGFR Non African Amer 98 mL/min/1.73      Globulin 3.7 gm/dL      A/G Ratio 1.3 g/dL      BUN/Creatinine Ratio 7.4     Anion Gap 10.8 mmol/L     Narrative:      GFR Normal >60  Chronic Kidney Disease <60  Kidney Failure <15      CBC Auto Differential [[id number]]  (Abnormal) Collected: 09/01/21 2022     Specimen: Blood Updated: 09/01/21 2042     WBC 4.28 10*3/mm3      RBC 4.35 10*6/mm3      Hemoglobin 13.9 g/dL      Hematocrit 41.1 %      MCV 94.5 fL      MCH 32.0 pg      MCHC 33.8 g/dL      RDW 13.8 %      RDW-SD 47.7 fl      MPV 10.2 fL      Platelets 311 10*3/mm3      Neutrophil % 40.7 %      Lymphocyte % 49.5 %      Monocyte % 6.8 %      Eosinophil % 2.3 %      Basophil % 0.5 %      Immature Grans % 0.2 %      Neutrophils, Absolute 1.74 10*3/mm3      Lymphocytes, Absolute 2.12 10*3/mm3      Monocytes, Absolute 0.29 10*3/mm3      Eosinophils, Absolute 0.10 10*3/mm3      Basophils, Absolute 0.02 10*3/mm3      Immature Grans, Absolute 0.01 10*3/mm3      nRBC 0.0 /100 WBC     hCG, Serum, Qualitative [[id number]]  (Normal) Collected: 09/01/21 2022    Specimen: Blood Updated: 09/01/21 2303     HCG Qualitative Negative    COVID-19 and FLU A/B PCR - Swab, Nasopharynx [[id number]]  (Normal) Collected: 09/01/21 2025    Specimen: Swab from Nasopharynx Updated: 09/01/21 2139     COVID19 Not Detected     Influenza A PCR Not Detected     Influenza B PCR Not Detected    Narrative:      Fact sheet for providers: https://www.fda.gov/media/692909/download    Fact sheet for patients: https://www.fda.gov/media/636176/download    Test performed by PCR.            CT Head Without Contrast   Final Result   No acute intracranial abnormality.               Signer Name: Alisa Carrera MD    Signed: 9/1/2021 11:54 PM    Workstation Name: [id number]     [facility]            New Medications Ordered This Visit   Medications   • prochlorperazine (COMPAZINE) tablet 10 mg   • diphenhydrAMINE (BENADRYL) capsule 50 mg         Procedures            MEDICAL DECISION-MAKING AND ED COURSE:     ED Course as of Sep 02 0137   Thu Sep 02, 2021   0012 MDM: 36-year-old female with chronic migraines with now migraine intractable to home prescribed occasions Nurtec and rizatriptan.  CT brain negative for acute findings.  CBC and CMP  nondiagnostic.  Covid negative, not pregnant.  Will treat with Compazine Benadryl.    [KP]   0053 CT head negative for abnormality.    [KP]   0136 Patient excepted Compazine, declined Benadryl.  Discussed with patient regarding reason for Benadryl to help prevent or treat dystonic reaction.  Patient still declined Benadryl.  Patient reports headache has improved, will DC with PCP follow-up.    [KP]      ED Course User Index  [KP] Ted Cheng MD       ?      FINAL IMPRESSION:       1. Migraine without status migrainosus, not intractable, unspecified migraine type         The complaints listed here are new problems to this examiner.      FOLLOW-UP  Mony Manriquez MD  [address] [id number] [phone]            DISPOSITION  ED Disposition     ED Disposition Condition Comment    Discharge Stable                 This care is provided during an unprecedented national emergency due to the Novel Coronavirus (COVID-19). COVID-19 infections and transmission risks place heavy strains on healthcare resources. As this pandemic evolves, the Hospital and providers strive to respond fluidly, to remain operational, and to provide care relative to available resources and information. Outcomes are unpredictable and treatments are without well-defined guidelines. Further, the impact of COVID-19 on all aspects of emergency care, including the impact to patients seeking care for reasons other than COVID-19, is unavoidable during this national emergency.    This note was dictated using a [id number] tool. Occasional wrong-word or 'sound-a-like' substitutions may have occurred due to the inherent limitations of voice recognition software. ?Read the chart carefully and recognize, using context, where substitutions have occurred.    Ted Cheng MD  01:37 EDT  9/2/2021             Ted Cheng MD  09/02/21 0137

## 2021-09-02 NOTE — ED NOTES
Again called patient for medication administration. Patient did not respond. Patient presumed to have left facility against medical advice after being seen by provider.      Alex Brito RN  09/01/21 2547

## 2022-08-08 ENCOUNTER — HOSPITAL ENCOUNTER (OUTPATIENT)
Dept: GENERAL RADIOLOGY | Facility: HOSPITAL | Age: 37
Discharge: HOME | End: 2022-08-08
Admitting: INTERNAL MEDICINE

## 2022-08-08 ENCOUNTER — TRANSCRIBE ORDERS (OUTPATIENT)
Dept: ADMINISTRATIVE | Facility: HOSPITAL | Age: 37
End: 2022-08-08

## 2022-08-08 DIAGNOSIS — M25.532 LEFT WRIST PAIN: Primary | ICD-10-CM

## 2022-08-08 DIAGNOSIS — M25.532 ACUTE PAIN OF LEFT WRIST: ICD-10-CM

## 2022-08-08 PROCEDURE — 73110 X-RAY EXAM OF WRIST: CPT

## 2022-08-08 PROCEDURE — 73110 X-RAY EXAM OF WRIST: CPT | Performed by: RADIOLOGY

## 2022-08-17 ENCOUNTER — OFFICE VISIT (OUTPATIENT)
Dept: ORTHOPEDIC SURGERY | Facility: CLINIC | Age: 37
End: 2022-08-17

## 2022-08-17 VITALS
DIASTOLIC BLOOD PRESSURE: 86 MMHG | WEIGHT: 180 LBS | BODY MASS INDEX: 26.66 KG/M2 | SYSTOLIC BLOOD PRESSURE: 122 MMHG | HEART RATE: 67 BPM | HEIGHT: 69 IN

## 2022-08-17 DIAGNOSIS — M25.532 LEFT WRIST PAIN: Primary | ICD-10-CM

## 2022-08-17 PROCEDURE — 99203 OFFICE O/P NEW LOW 30 MIN: CPT | Performed by: PHYSICIAN ASSISTANT

## 2022-08-17 RX ORDER — NAPROXEN 500 MG/1
500 TABLET ORAL 2 TIMES DAILY WITH MEALS
Qty: 60 TABLET | Refills: 2 | Status: SHIPPED | OUTPATIENT
Start: 2022-08-17

## 2022-08-17 RX ORDER — GABAPENTIN 100 MG/1
CAPSULE ORAL
COMMUNITY
Start: 2022-08-08

## 2022-08-17 RX ORDER — MULTIPLE VITAMINS W/ MINERALS TAB 9MG-400MCG
1 TAB ORAL DAILY
COMMUNITY

## 2022-08-17 RX ORDER — AMITRIPTYLINE HYDROCHLORIDE 25 MG/1
TABLET, FILM COATED ORAL
COMMUNITY
Start: 2022-08-09

## 2022-08-17 NOTE — PROGRESS NOTES
[facility] Surgery New Patient Visit          Patient: Coco Mahoney  YOB: 1985  Date of Encounter: 2022  PCP: Mony Manriquez MD      Subjective     Chief Complaint   Patient presents with   • Left Wrist - Initial Evaluation, Pain, Edema           History of Present Illness:     Coco Mahoney is a 37 y.o. female presents today as result of left wrist pain several months history of fall with a recent 1 month history of exacerbation.  Patient states that she suffered a fall in January in which she had multiple abrasions that has been treated and she was placed on medication and she was not short of any vomiting to the wrist at that time.  She began to notice pain over the last 1 month with no specific injury.  She reports pain to the ulnar aspect of the wrist both dorsally and volarly.  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  Over the last few weeks she has has been immobilized in a cock-up wrist bracing with reduction of her pain however she still reports stiffness.  She presents today with radiographs for further review.  She reports dull throbbing aching pain to the left wrist with no paresthesias.        Patient Active Problem List   Diagnosis   • Didelphic uterus   • Generalized anxiety disorder   • Panic disorder   • Migraine with aura and without status migrainosus, not intractable     Past Medical History:   Diagnosis Date   • Anxiety    • Migraine headache    • Nongonococcal urethritis due to chlamydia trachomatis    • Syncope      Past Surgical History:   Procedure Laterality Date   •  SECTION     • DILATATION AND CURETTAGE     • INGUINAL HERNIA REPAIR       Social History     Occupational History   • Not on file   Tobacco Use   • Smoking status: Former Smoker   • Smokeless tobacco: Never Used   • Tobacco comment: Half a pack a day for 5 years.   Vaping Use   • Vaping Use: Never used   Substance and Sexual  "Activity   • Alcohol use: Yes     Comment: Social use   • Drug use: No   • Sexual activity: Yes     Partners: Male     Birth control/protection: OCP    Coco Mahoney  reports that she has quit smoking. She has never used smokeless tobacco.. I have educated her on the risk of diseases from using tobacco products such as cancer, COPD and heart disease.           Social History     Social History Narrative   • Not on file     Family History   Problem Relation Age of Onset   • Migraines Mother    • Atrial fibrillation Father    • Diabetes Father    • Heart disease Father    • Hypertension Brother    • Breast cancer Paternal Grandmother      Current Outpatient Medications   Medication Sig Dispense Refill   • amitriptyline (ELAVIL) 25 MG tablet      • gabapentin (NEURONTIN) 100 MG capsule      • LORazepam (ATIVAN) 1 MG tablet Take 1 tablet by mouth As Needed for anxiety. 30 tablet 2   • multivitamin with minerals (MULTIVITAMIN ADULT PO) Take 1 tablet by mouth Daily.     • naproxen (NAPROSYN) 500 MG tablet Take 1 tablet by mouth 2 (Two) Times a Day With Meals. 60 tablet 2     No current facility-administered medications for this visit.     Allergies   Allergen Reactions   • Escitalopram Palpitations   • Paroxetine Palpitations            Review of Systems   Constitutional: Negative.   HENT: Negative.    Eyes: Negative.    Cardiovascular: Negative.    Respiratory: Negative.    Endocrine: Negative.    Hematologic/Lymphatic: Negative.    Skin: Negative.    Musculoskeletal:        Pertinent positives listed in HPI   Gastrointestinal: Negative.    Genitourinary: Negative.    Neurological: Negative.    Psychiatric/Behavioral: Negative.    Allergic/Immunologic: Negative.          Objective      Vitals:    08/17/22 0953   BP: 122/86   Pulse: 67   Weight: 81.6 kg (180 lb)   Height: 175.3 cm (69\")      BMI is >= 25 and <30. (Overweight) The following options were offered after discussion;: exercise counseling/recommendations and " nutrition counseling/recommendations      Physical Exam  Vitals and nursing note reviewed.   Constitutional:       General: She is not in acute distress.     Appearance: She is not ill-appearing.   HENT:      Head: Normocephalic and atraumatic.      Right Ear: External ear normal.      Left Ear: External ear normal.      Nose: Nose normal. No congestion or rhinorrhea.   Eyes:      Extraocular Movements: Extraocular movements intact.      Conjunctiva/sclera: Conjunctivae normal.      Pupils: Pupils are equal, round, and reactive to light.   Cardiovascular:      Rate and Rhythm: Normal rate.      Pulses: Normal pulses.   Pulmonary:      Effort: Pulmonary effort is normal. No respiratory distress.      Breath sounds: No stridor.   Abdominal:      General: There is no distension.   Skin:     General: Skin is warm and dry.      Capillary Refill: Capillary refill takes less than 2 seconds.   Neurological:      General: No focal deficit present.      Mental Status: She is alert and oriented to person, place, and time.   Psychiatric:         Mood and Affect: Mood normal.         Behavior: Behavior normal.         Thought Content: Thought content normal.         Judgment: Judgment normal.     Hand/Wrist Musculoskeletal Exam    General      Neurological: alert    Inspection      Inspection - Left         Erythema: none      Ecchymosis: none      Edema: mild      Deformity: none      Hand - prior incision: none      Wrist - prior incision: none    Palpation      Palpation - Left         Wrist tenderness to palpation: first dorsal compartment, extensor carpi ulnaris, flexor carpi ulnarus, radial carpal joint, triangle fibrocartilage complex and ulnar snuffbox    Range of Motion      Range of Motion - Left Wrist         Active extension: Normal.      Active Flexion: 60      Passive Flexion: 90      Active Radial Deviation: 35      Passive Radial Deviation: 35      Active Ulnar Deviation: 30      Passive Ulnar Deviation:  40    Strength      Strength - Left Wrist         Extension: 4+/5      Extension: affected by pain      Flexion: 4+/5      Flexion: affected by pain      Radial deviation: 5/5      Ulnar deviation: 4+/5      Ulnar deviation: affected by pain      Pronation: 4+/5      Pronation: affected by pain      Supination: 4+/5      Supination: affected by pain    Neurovascular      Neurovascular - Left         Left neurovascular exam is normal.    Special Tests      Special Tests - Left        Phalen's: negative      Deandre's test: negative      Marie's test: negative      Finkelstein's test: negative      TFCC load test: negative              Radiology:      XR Wrist 3+ View Left    Result Date: 8/8/2022    No acute findings in the left wrist.  This report was finalized on 8/8/2022 2:39 PM by Dr. Dani Negrete MD.              Assessment/Plan        ICD-10-CM ICD-9-CM   1. Left wrist pain  M25.532 719.43       37-year-old female with notable ulnar wrist pain and subsequent exacerbated tendinitis.  Further discussion was had with patient and she will slowly begin to discontinue the brace that she is been wearing this for 4 weeks.  She was also provided with a prescription for Naprosyn 500 mg 1 p.o. twice daily with meals #60 with 2 refills.  She will return back in 2 weeks for further evaluation of efficacy of conservative treatment.  We discussed potential for physical therapy depending on the efficacy of treatment thus far.      Procedures                This document was signed by Antione Hunter PA-C August 17, 2022     CC: Mony Manriquez MD      Dictated Utilizing Dragon Dictation: Part of this note may be an electronic transcription/translation of spoken language to printed text using the Dragon Dictation System.

## 2022-08-31 ENCOUNTER — OFFICE VISIT (OUTPATIENT)
Dept: ORTHOPEDIC SURGERY | Facility: CLINIC | Age: 37
End: 2022-08-31

## 2022-08-31 VITALS — HEIGHT: 69 IN | WEIGHT: 180 LBS | BODY MASS INDEX: 26.66 KG/M2

## 2022-08-31 DIAGNOSIS — S69.82XD INJURY OF TRIANGULAR FIBROCARTILAGE COMPLEX (TFCC) OF LEFT WRIST, SUBSEQUENT ENCOUNTER: ICD-10-CM

## 2022-08-31 DIAGNOSIS — M25.532 LEFT WRIST PAIN: Primary | ICD-10-CM

## 2022-08-31 PROCEDURE — 99213 OFFICE O/P EST LOW 20 MIN: CPT | Performed by: PHYSICIAN ASSISTANT

## 2022-08-31 NOTE — PROGRESS NOTES
[facility] Establ[facility] Patient Visit          Patient: Coco Mahoney  YOB: 1985  Date of Encounter: 2022  PCP: Mony Manriquez MD      Subjective     Chief Complaint   Patient presents with   • Left Wrist - Follow-up, Pain, Edema           History of Present Illness:     Coco Mahoney is a 37 y.o. female presents today as result of left wrist pain several months history of fall with a recent 2 month history of exacerbation.  Patient states that she suffered a fall in January in which she had multiple abrasions that has been treated and she was placed on medication and she was not sure of any complication to the wrist at that time.  She began to notice pain over the last 2 months with no specific injury.  She reports pain to the ulnar aspect of the wrist both dorsally and volarly.  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  Over the last few weeks she has continued the wrist bracing with some reduction of her pain however she still reports stiffness with flexion and nighttime pain. She continues to report dull throbbing aching pain to the left wrist with no paresthesias.        Patient Active Problem List   Diagnosis   • Didelphic uterus   • Generalized anxiety disorder   • Panic disorder   • Migraine with aura and without status migrainosus, not intractable     Past Medical History:   Diagnosis Date   • Anxiety    • Migraine headache    • Nongonococcal urethritis due to chlamydia trachomatis    • Syncope      Past Surgical History:   Procedure Laterality Date   •  SECTION     • DILATATION AND CURETTAGE     • INGUINAL HERNIA REPAIR       Social History     Occupational History   • Not on file   Tobacco Use   • Smoking status: Former Smoker   • Smokeless tobacco: Never Used   • Tobacco comment: Half a pack a day for 5 years.   Vaping Use   • Vaping Use: Never used   Substance and Sexual Activity   • Alcohol use:  "Yes     Comment: Social use   • Drug use: No   • Sexual activity: Yes     Partners: Male     Birth control/protection: OCP    Coco Mahoney  reports that she has quit smoking. She has never used smokeless tobacco.. I have educated her on the risk of diseases from using tobacco products such as cancer, COPD and heart disease.           Social History     Social History Narrative   • Not on file     Family History   Problem Relation Age of Onset   • Migraines Mother    • Atrial fibrillation Father    • Diabetes Father    • Heart disease Father    • Hypertension Brother    • Breast cancer Paternal Grandmother      Current Outpatient Medications   Medication Sig Dispense Refill   • amitriptyline (ELAVIL) 25 MG tablet      • gabapentin (NEURONTIN) 100 MG capsule      • LORazepam (ATIVAN) 1 MG tablet Take 1 tablet by mouth As Needed for anxiety. 30 tablet 2   • multivitamin with minerals tablet tablet Take 1 tablet by mouth Daily.     • naproxen (NAPROSYN) 500 MG tablet Take 1 tablet by mouth 2 (Two) Times a Day With Meals. 60 tablet 2     No current facility-administered medications for this visit.     Allergies   Allergen Reactions   • Escitalopram Palpitations   • Paroxetine Palpitations            Review of Systems   Constitutional: Negative.   HENT: Negative.    Eyes: Negative.    Cardiovascular: Negative.    Respiratory: Negative.    Endocrine: Negative.    Hematologic/Lymphatic: Negative.    Skin: Negative.    Musculoskeletal:        Pertinent positives listed in HPI   Gastrointestinal: Negative.    Genitourinary: Negative.    Neurological: Negative.    Psychiatric/Behavioral: Negative.    Allergic/Immunologic: Negative.          Objective      Vitals:    08/31/22 0907   Weight: 81.6 kg (180 lb)   Height: 175.3 cm (69\")      BMI is >= 25 and <30. (Overweight) The following options were offered after discussion;: exercise counseling/recommendations and nutrition counseling/recommendations      Physical " Exam  Vitals and nursing note reviewed.   Constitutional:       General: She is not in acute distress.     Appearance: She is not ill-appearing.   HENT:      Head: Normocephalic and atraumatic.      Right Ear: External ear normal.      Left Ear: External ear normal.      Nose: Nose normal. No congestion or rhinorrhea.   Eyes:      Extraocular Movements: Extraocular movements intact.      Conjunctiva/sclera: Conjunctivae normal.      Pupils: Pupils are equal, round, and reactive to light.   Cardiovascular:      Rate and Rhythm: Normal rate.      Pulses: Normal pulses.   Pulmonary:      Effort: Pulmonary effort is normal. No respiratory distress.      Breath sounds: No stridor.   Abdominal:      General: There is no distension.   Skin:     General: Skin is warm and dry.      Capillary Refill: Capillary refill takes less than 2 seconds.   Neurological:      General: No focal deficit present.      Mental Status: She is oriented to person, place, and time.   Psychiatric:         Mood and Affect: Mood normal.         Behavior: Behavior normal.         Thought Content: Thought content normal.         Judgment: Judgment normal.     Hand/Wrist Musculoskeletal Exam    Inspection      Inspection - Left         Erythema: none      Ecchymosis: none      Edema: mild      Deformity: none      Hand - prior incision: none      Wrist - prior incision: none    Palpation      Palpation - Left         Wrist tenderness to palpation: extensor carpi ulnaris, flexor carpi ulnarus, radial carpal joint, triangle fibrocartilage complex and ulnar snuffbox    Range of Motion      Range of Motion - Left Wrist         Active extension: Normal.      Active Flexion: 60      Passive Flexion: 90      Active Radial Deviation: 35      Passive Radial Deviation: 35      Active Ulnar Deviation: 30      Passive Ulnar Deviation: 40    Strength      Strength - Left Wrist         Extension: 4+/5      Extension: affected by pain      Flexion: 4+/5      Flexion:  affected by pain      Radial deviation: 5/5      Ulnar deviation: 4+/5      Ulnar deviation: affected by pain      Pronation: 4+/5      Pronation: affected by pain      Supination: 4+/5      Supination: affected by pain    Neurovascular      Neurovascular - Left         Left neurovascular exam is normal.    Special Tests      Special Tests - Left        Phalen's: negative      Deandre's test: negative      Marie's test: negative      Finkelstein's test: negative      DRUJ instability: negative      TFCC load test: negative              Radiology:      XR Wrist 3+ View Left    Result Date: 8/8/2022    No acute findings in the left wrist.  This report was finalized on 8/8/2022 2:39 PM by Dr. Dani Negrete MD.              Assessment/Plan        ICD-10-CM ICD-9-CM   1. Left wrist pain  M25.532 719.43   2. Injury of triangular fibrocartilage complex (TFCC) of left wrist, subsequent encounter  S69.82XD V58.89     37-year-old female with notable ulnar wrist pain and subsequent TFCC tendinitis.  Further discussion was had with patient and she will discontinue the brace and only wear this at night as this is causing difficulty upon her to previously.  She will implement continued NSAID usage for pain and swelling.  Patient also will begin formal outpatient physical therapy with modalities as deemed appropriate.  Patient return back to that time in 4 weeks for further evaluation and discussion of potential further diagnostic imaging if no alleviation with therapy.                       This document was signed by Antione Hunter PA-C August 31, 2022     CC: Mony Manriquez MD      Dictated Utilizing Dragon Dictation: Part of this note may be an electronic transcription/translation of spoken language to printed text using the Dragon Dictation System.

## 2022-09-08 ENCOUNTER — TELEPHONE (OUTPATIENT)
Dept: ORTHOPEDIC SURGERY | Facility: CLINIC | Age: 37
End: 2022-09-08

## 2022-09-08 NOTE — TELEPHONE ENCOUNTER
Provider: SALIMA GARCIA  Caller: CARINA SULLIVAN  Relationship to Patient: SELF  Pharmacy:[facility] - [place], [place] - [address] [facility] [address] - [phone] [facility] [phone] FX   Phone Number:[phone]  Reason for Call:  PATIENT CALLED WANTING TO KNOW IF SHE COULD BE PRESCIBED ANYTHING TO HELP WITH PAIN THROUGHOUT THE NIGHT. CURRENTLY TAKES NAPROXEN (NAPROSYN) 500 MG BUT PAIN HAS BEEN INTERRUPTING HER SLEEP. PATIENT IS REQUESTING A CALL BACK. PLEASE ADVISE.

## 2022-12-03 VITALS
BODY MASS INDEX: 26.66 KG/M2 | WEIGHT: 180 LBS | TEMPERATURE: 98.1 F | DIASTOLIC BLOOD PRESSURE: 102 MMHG | OXYGEN SATURATION: 100 % | SYSTOLIC BLOOD PRESSURE: 150 MMHG | HEIGHT: 69 IN | HEART RATE: 105 BPM | RESPIRATION RATE: 18 BRPM

## 2022-12-03 PROCEDURE — 99281 EMR DPT VST MAYX REQ PHY/QHP: CPT

## 2022-12-04 ENCOUNTER — HOSPITAL ENCOUNTER (EMERGENCY)
Facility: HOSPITAL | Age: 37
Discharge: HOME | End: 2022-12-04
Attending: EMERGENCY MEDICINE | Admitting: EMERGENCY MEDICINE

## 2022-12-04 DIAGNOSIS — M25.532 ACUTE PAIN OF LEFT WRIST: Primary | ICD-10-CM

## 2022-12-05 NOTE — ED PROVIDER NOTES
EMERGENCY DEPARTMENT ENCOUNTER    Pt Name: Coco Mahoney  MRN: [id number]  Pt :   1985  Room Number:  CT/CT  Date of encounter:  12/3/2022  PCP: Mony Manriquez MD  ED Provider: Dany Leonard DO    Historian: Patient      HPI:  Chief Complaint: Left wrist pain        Context: Coco Mahoney is a 37 y.o. female who presents to the ED c/o persistent left wrist pain.  Pt states that she fell 4 months ago and injured her left wrist. Pt has seen her PCP, orthopedics, and had radiographs of the affected area which initially noted possible stable fractures in the left wrist.  Her care was managed by her PCP, orthopedics, and she has worked with physical/occupational therapy since that time.  Pt denies new symptoms.  Denies fever, chills, chest pain, soa, abdominal pain, nausea, vomiting, diarrhea, or other acute complaint.She states that her PCP has ordered an MRI of the wrist due to her persistent discomfort.  Insurance has not yet given the paitent the necessary authorization and she presents today in hopes to have the MRI imaging performed.    PAST MEDICAL HISTORY  Past Medical History:   Diagnosis Date   • Anxiety    • Migraine headache    • Nongonococcal urethritis due to chlamydia trachomatis    • Syncope          PAST SURGICAL HISTORY  Past Surgical History:   Procedure Laterality Date   •  SECTION     • DILATATION AND CURETTAGE     • INGUINAL HERNIA REPAIR           FAMILY HISTORY  Family History   Problem Relation Age of Onset   • Migraines Mother    • Atrial fibrillation Father    • Diabetes Father    • Heart disease Father    • Hypertension Brother    • Breast cancer Paternal Grandmother          SOCIAL HISTORY  Social History     Socioeconomic History   • Marital status:    Tobacco Use   • Smoking status: Former   • Smokeless tobacco: Never   • Tobacco comments:     Half a pack a day for 5 years.   Vaping Use   • Vaping Use: Never used   Substance and Sexual Activity   •  Alcohol use: Yes     Comment: Social use   • Drug use: No   • Sexual activity: Yes     Partners: Male     Birth control/protection: OCP         ALLERGIES  Escitalopram and Paroxetine        REVIEW OF SYSTEMS  Review of Systems       All systems reviewed and negative except for those discussed in HPI.       PHYSICAL EXAM    I have reviewed the triage vital signs and nursing notes.    ED Triage Vitals [12/03/22 2331]   Temp Heart Rate Resp BP SpO2   98.1 °F (36.7 °C) 105 18 (!) 150/102 100 %      Temp src Heart Rate Source Patient Position BP Location FiO2 (%)   Oral Monitor Sitting Left arm --       Physical Exam  GENERAL:   Appears comfortable. NAD  HENT: Nares patent.  EYES: No scleral icterus.  CV: Regular rhythm, regular rate.  RESPIRATORY: Normal effort.  No audible wheezes, rales or rhonchi.  ABDOMEN: Soft, nontender  MUSCULOSKELETAL: No deformities. Limited ROM if the left wrist secondary to pain.  Specifically flexion of hte right wrist is reduced.  Mild TTP of the left wrist.  NEURO: Alert, moves all extremities, follows commands.  SKIN: Warm, dry, no rash visualized.        LAB RESULTS  No results found for this or any previous visit (from the past 24 hour(s)).    If labs were ordered, I independently reviewed the results.        RADIOLOGY  No Radiology Exams Resulted Within Past 24 Hours          PROCEDURES    Procedures    No orders to display       MEDICATIONS GIVEN IN ER    Medications - No data to display      PROGRESS, DATA ANALYSIS, CONSULTS, AND MEDICAL DECISION MAKING      I emphasized the importance of continued follow up with orthopedics and PCP for the desired imaging and management.  Pt understands to return to the ED if symptoms persist, worsen, or other concerns arise.                   AS OF 00:59 EST VITALS:    BP - (!) 150/102  HR - 105  TEMP - 98.1 °F (36.7 °C) (Oral)  O2 SATS - 100%                  DIAGNOSIS  Final diagnoses:   Acute pain of left wrist          DISPOSITION  DISCHARGE    Patient discharged in stable condition.    Reviewed implications of results, diagnosis, meds, responsibility to follow up, warning signs and symptoms of possible worsening, potential complications and reasons to return to ER.    Patient/Family voiced understanding of above instructions.    Discussed plan for discharge, as there is no emergent indication for admission.  Pt/family is agreeable and understands need for follow up and possible repeat testing.  Pt/family is aware that discharge does not mean that nothing is wrong but that it indicates no emergency is currently present that requires admission and they must continue care with follow-up as given below or with a physician of their choice.     FOLLOW-UP  Mony Manriquez MD  [address] [address]  [phone]    Schedule an appointment as soon as possible for a visit       [facility] Emergency Department  [address] [facility] [address] [phone]    If symptoms worsen    Your orthopedic physician    Schedule an appointment as soon as possible for a visit            Dany Leonard DO  12/05/22 0101

## 2023-08-09 ENCOUNTER — OFFICE VISIT (OUTPATIENT)
Dept: PSYCHIATRY | Facility: CLINIC | Age: 38
End: 2023-08-09
Payer: MEDICAID

## 2023-08-09 VITALS
HEIGHT: 69 IN | BODY MASS INDEX: 27.19 KG/M2 | OXYGEN SATURATION: 99 % | DIASTOLIC BLOOD PRESSURE: 86 MMHG | TEMPERATURE: 98.2 F | HEART RATE: 98 BPM | WEIGHT: 183.6 LBS | SYSTOLIC BLOOD PRESSURE: 134 MMHG

## 2023-08-09 DIAGNOSIS — F41.0 PANIC DISORDER: ICD-10-CM

## 2023-08-09 DIAGNOSIS — F41.1 GENERALIZED ANXIETY DISORDER: Primary | ICD-10-CM

## 2023-08-09 PROCEDURE — 1159F MED LIST DOCD IN RCRD: CPT | Performed by: NURSE PRACTITIONER

## 2023-08-09 PROCEDURE — 90792 PSYCH DIAG EVAL W/MED SRVCS: CPT | Performed by: NURSE PRACTITIONER

## 2023-08-09 PROCEDURE — 1160F RVW MEDS BY RX/DR IN RCRD: CPT | Performed by: NURSE PRACTITIONER

## 2023-08-09 RX ORDER — ESCITALOPRAM OXALATE 10 MG/1
TABLET ORAL
Qty: 30 TABLET | Refills: 1 | Status: SHIPPED | OUTPATIENT
Start: 2023-08-09

## 2023-08-09 RX ORDER — ONDANSETRON 4 MG/1
4 TABLET, ORALLY DISINTEGRATING ORAL EVERY 8 HOURS PRN
COMMUNITY
Start: 2023-02-27

## 2023-08-09 RX ORDER — PROPRANOLOL HYDROCHLORIDE 20 MG/1
20 TABLET ORAL 2 TIMES DAILY PRN
Qty: 60 TABLET | Refills: 1 | Status: SHIPPED | OUTPATIENT
Start: 2023-08-09

## 2023-08-09 RX ORDER — RIMEGEPANT SULFATE 75 MG/75MG
TABLET, ORALLY DISINTEGRATING ORAL AS NEEDED
COMMUNITY

## 2023-08-09 RX ORDER — RIZATRIPTAN BENZOATE 10 MG/1
10 TABLET ORAL ONCE AS NEEDED
COMMUNITY
Start: 2023-03-07

## 2023-08-09 NOTE — PROGRESS NOTES
"Subjective   Coco Mahoney is a 38 y.o. female who is here today for initial appointment to evaluate for medication options. Presents by herself.      Chief Complaint:  anxiety.  Provider is not longer practicing and needs med continued.      HPI: Patient states that she can remember having anxiety starting in her teenage years.  She vividly remembers her mom being out of town, there were no cell phones, it was snowing severe and she could not get a hold of her.  States she had a massive panic attack.  She had another really bad panic attack at age 19 when [place].  No certain trigger just driving on the interstate in general makes her nervous.  An ambulance had to come get her off the side of the road.  She states she did not drive for years.  It was at that time she was going to the [facility].  Remember taking Celexa took it for less than a year and does not really remember much other than she does not know how well it helped because it has been so long ago.  She states approximately 7 years ago she and her  split up after her son was diagnosed with autism.  She states it was a very stressful time for her and this is when she was started on Ativan.  Ativan did originally really help with her anxiety however it helps some now but not as much as it did initially.  She denies any oh CD behaviors.  Denies any depression or feelings of hopelessness.  No flashbacks to any traumatic events.  Denies any etta symptoms.  Denies anger blowups or excessive irritability.  At times she feels like she is an inpatient person but otherwise maintains control.  She sleeps well at night except recently she has been having hand pain from an injury and may have to have more surgery and this has been waking her up some.  She does do a lot of \"what if\" as well as catastrophic thinking.  She states that she over thinks everything.  Rates her anxiety presently at 2-3 out of 10 with 10 being " "severe.  States if she does not take her Ativan by midday she starts to feel \"strange\".  She has no trouble with focus and concentration.  She has no history of seizures, cardiac issues, or head trauma.Body mass index is 27.1 kg/mý.  No recent weight changes.  History of Present Illness    Past Psych History:  no inpatient hospitalizations.  No suicide attempts.  Thinks saw psychiatrist when younger early 20s.      Previous Psych Meds:  celexa does not think helped.  Xanax at that time as well in her early 20s.   Wellbutrin made her severely depressed.    Substance Abuse:  none    Social History:  born in [place] moved here age 11.  Raised by 2 parents.  Graduated HS.  Some college. Worked at various pharmacies.  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  Currently works full time at [employer].   twice.  First marriage for 3 months.  She says she was young and pregnant and miscarried.  Second current marriage 12 years.  Has 1 child 11 years old.  Son is autistic.  Lives with  and child.  Currently building a home and staying with in laws until house gets built next month.   worked for [employer].  Took off currently to build house.  Never arrested no pending legal issues.  Taoism maria.  Loves to travel.        Family Psychiatric History:  family history includes Atrial fibrillation in her father; Breast cancer in her paternal grandmother; Diabetes in her father; Heart disease in her father; Hypertension in her brother; Migraines in her mother.    Medical/Surgical History:  Past Medical History:   Diagnosis Date    Anxiety     Migraine headache     Nongonococcal urethritis due to chlamydia trachomatis     Syncope      Past Surgical History:   Procedure Laterality Date     SECTION      DILATATION AND CURETTAGE      INGUINAL HERNIA REPAIR         No Known Allergies          Current Medications:   Current Outpatient Medications   Medication Sig Dispense " Refill    gabapentin (NEURONTIN) 100 MG capsule       LORazepam (ATIVAN) 1 MG tablet Take 1 tablet by mouth As Needed for anxiety. 30 tablet 2    multivitamin with minerals tablet tablet Take 1 tablet by mouth Daily.      Nurtec 75 MG dispersible tablet Take  by mouth As Needed.      ondansetron ODT (ZOFRAN-ODT) 4 MG disintegrating tablet Place 1 tablet on the tongue Every 8 (Eight) Hours As Needed.      rizatriptan (MAXALT) 10 MG tablet Take 1 tablet by mouth 1 (One) Time As Needed.      escitalopram (Lexapro) 10 MG tablet 1/2 po daily for 7 days then increase to daily 30 tablet 1    propranolol (INDERAL) 20 MG tablet Take 1 tablet by mouth 2 (Two) Times a Day As Needed (anxiety). 60 tablet 1     No current facility-administered medications for this visit.         Review of Systems   Constitutional:  Negative for activity change, appetite change and fatigue.   Eyes:  Negative for visual disturbance.   Respiratory: Negative.     Cardiovascular: Negative.    Gastrointestinal:  Negative for nausea.   Endocrine: Negative.    Genitourinary: Negative.         Left wrist pain   Musculoskeletal:  Negative for arthralgias.   Skin: Negative.    Allergic/Immunologic: Negative.    Neurological:  Positive for headaches. Negative for dizziness and seizures.        History of migraines   Hematological: Negative.    Psychiatric/Behavioral:  Negative for agitation, behavioral problems, confusion, decreased concentration, dysphoric mood, hallucinations, self-injury, sleep disturbance and suicidal ideas. The patient is nervous/anxious. The patient is not hyperactive.   denies HEENT, cardiovascular, respiratory, liver, renal, GI/, endocrine, neuro, DERM, hematology, immunology, musculoskeletal disorders.    Objective   Physical Exam  Vitals reviewed.   Constitutional:       Appearance: Normal appearance.   Musculoskeletal:      Cervical back: Normal range of motion and neck supple.   Neurological:      General: No focal deficit  "present.      Mental Status: She is alert and oriented to person, place, and time.   Psychiatric:         Attention and Perception: Attention normal.         Mood and Affect: Mood normal.         Speech: Speech normal.         Behavior: Behavior normal. Behavior is cooperative.         Thought Content: Thought content normal.         Cognition and Memory: Cognition normal.     Blood pressure 134/86, pulse 98, temperature 98.2 øF (36.8 øC), height 175.3 cm (69.02\"), weight 83.3 kg (183 lb 9.6 oz), SpO2 99 %.    Mental Status Exam:   Hygiene:   good  Cooperation:  Cooperative  Eye Contact:  Good  Psychomotor Behavior:  Appropriate  Affect:  Full range  Hopelessness: Denies  Speech:  Normal  Thought Process:  Goal directed  Thought Content:  Normal  Suicidal:  None  Homicidal:  None  Hallucinations:  None  Delusion:  None  Memory:  Intact  Orientation:  Person, Place, Time, and Situation  Reliability:  good  Insight:  Good  Judgement:  Good  Impulse Control:  Good  Physical/Medical Issues:  Yes current issues left wrist      Short-term goals: Patient will be compliant with clinic appointments.  Patient will be engaged in therapy, medication compliant with minimal side effects. Patient  will report decrease of symptoms and frequency.    Long-term goals: Patient will have minimal symptoms of  with continued medication management. Patient will be compliant with treatment and appointments.       Problem list:   Strengths:  Weaknesses:     Assessment & Plan   Problems Addressed this Visit          Mental Health    Generalized anxiety disorder - Primary    Relevant Medications    escitalopram (Lexapro) 10 MG tablet    propranolol (INDERAL) 20 MG tablet    Panic disorder    Relevant Medications    escitalopram (Lexapro) 10 MG tablet    propranolol (INDERAL) 20 MG tablet     Diagnoses         Codes Comments    Generalized anxiety disorder    -  Primary ICD-10-CM: F41.1  ICD-9-CM: 300.02     Panic disorder     ICD-10-CM: " F41.0  ICD-9-CM: 300.01           Functionality: pt having significant impairment in important areas of daily functioning.   Prognosis: Good dependent on medication/follow up and treatment plan compliance.     Christiano reviewed.  Uds performed today and pending  A really lengthy discussion regarding her treatment plan.  I explained how benzodiazepines are not indicated for her chronic long-term use of anxiety.  Explained how the tolerance development and the medication tends to not work as well as it did plus the problem of forming dependence on the medicine.  She is willing to try another medication.  I am going to continue her on the Ativan while we are trying to get her on an SSRI to help control this ongoing anxiety.  Going to try her on some Lexapro.  Risks, benefits, side effects as well as onset of action were discussed.  Going to give her some Inderal as well as needed for anxiety and explained how this medication is to be used.  For now she will continue the Ativan with the goal being to get established on an SSRI and the Inderal and she can still have some Ativan as needed if she has panic attacks.  Goal is for her not to take it daily.  Also discussed that my medications are not recommended in pregnancy especially the Ativan and the Inderal both.  She verbalized understanding of this.  She states that she does not feel she can get pregnant as her baby was conceived through fertility treatments.  I told her once again that birth control was always recommended with these meds.  As part of the patient's treatment plan they are being prescribed a controlled substance with potential for abuse.  The patient has been made aware of the appropriate use of such medications,  It has been made clear these medications are for use by the patient only, without concomitant use of alcohol or other substances unless prescribed/advised by medical provider. Patient has completed prescribing agreement detailing term of  continued prescribing of controlled substances including monitoring NICO reports, urine drug screens, and pill counts.  The patient is aware NICO reports are reviewed on a regular basis and scanned into the chart. Patient is aware the medication has abuse potential.   Continuing efforts to promote the therapeutic alliance, address the patient's issues, and strengthen self awareness, insights, and coping skills       Patient is aware to contact the  Clinic with any worsening of symptom.  Patient is agreeable to go to the ER or call 911 should they begin SI/HI.     Return in 6 weeks.  Sooner if needed.        This document has been electronically signed by PARAMJIT Salas on   August 9, 2023 16:29 EDT.

## 2023-08-10 DIAGNOSIS — F41.0 PANIC DISORDER: ICD-10-CM

## 2023-08-10 DIAGNOSIS — F41.1 GENERALIZED ANXIETY DISORDER: ICD-10-CM

## 2023-08-10 RX ORDER — LORAZEPAM 1 MG/1
TABLET ORAL
Qty: 60 TABLET | Refills: 1 | Status: SHIPPED | OUTPATIENT
Start: 2023-08-10

## 2024-10-23 ENCOUNTER — APPOINTMENT (OUTPATIENT)
Dept: CT IMAGING | Facility: HOSPITAL | Age: 39
End: 2024-10-23
Payer: MEDICAID

## 2024-10-23 ENCOUNTER — HOSPITAL ENCOUNTER (EMERGENCY)
Facility: HOSPITAL | Age: 39
Discharge: HOME | End: 2024-10-23
Attending: STUDENT IN AN ORGANIZED HEALTH CARE EDUCATION/TRAINING PROGRAM | Admitting: STUDENT IN AN ORGANIZED HEALTH CARE EDUCATION/TRAINING PROGRAM
Payer: MEDICAID

## 2024-10-23 ENCOUNTER — APPOINTMENT (OUTPATIENT)
Dept: GENERAL RADIOLOGY | Facility: HOSPITAL | Age: 39
End: 2024-10-23
Payer: MEDICAID

## 2024-10-23 VITALS
SYSTOLIC BLOOD PRESSURE: 143 MMHG | RESPIRATION RATE: 14 BRPM | WEIGHT: 175 LBS | HEIGHT: 69 IN | TEMPERATURE: 97.8 F | OXYGEN SATURATION: 99 % | DIASTOLIC BLOOD PRESSURE: 87 MMHG | HEART RATE: 85 BPM | BODY MASS INDEX: 25.92 KG/M2

## 2024-10-23 DIAGNOSIS — R42 VERTIGO: Primary | ICD-10-CM

## 2024-10-23 LAB
ALBUMIN SERPL-MCNC: 4.3 G/DL (ref 3.5–5.2)
ALBUMIN/GLOB SERPL: 1.3 G/DL
ALP SERPL-CCNC: 71 U/L (ref 39–117)
ALT SERPL W P-5'-P-CCNC: 43 U/L (ref 1–33)
AMPHET+METHAMPHET UR QL: NEGATIVE
AMPHETAMINES UR QL: NEGATIVE
ANION GAP SERPL CALCULATED.3IONS-SCNC: 19 MMOL/L (ref 5–15)
AST SERPL-CCNC: 81 U/L (ref 1–32)
BACTERIA UR QL AUTO: ABNORMAL /HPF
BARBITURATES UR QL SCN: NEGATIVE
BASOPHILS # BLD AUTO: 0.03 10*3/MM3 (ref 0–0.2)
BASOPHILS NFR BLD AUTO: 0.6 % (ref 0–1.5)
BENZODIAZ UR QL SCN: NEGATIVE
BILIRUB SERPL-MCNC: 0.6 MG/DL (ref 0–1.2)
BILIRUB UR QL STRIP: NEGATIVE
BUN SERPL-MCNC: 6 MG/DL (ref 6–20)
BUN/CREAT SERPL: 11.5 (ref 7–25)
BUPRENORPHINE SERPL-MCNC: NEGATIVE NG/ML
CALCIUM SPEC-SCNC: 9.1 MG/DL (ref 8.6–10.5)
CANNABINOIDS SERPL QL: NEGATIVE
CHLORIDE SERPL-SCNC: 99 MMOL/L (ref 98–107)
CLARITY UR: CLEAR
CO2 SERPL-SCNC: 19 MMOL/L (ref 22–29)
COCAINE UR QL: NEGATIVE
COLOR UR: YELLOW
CREAT SERPL-MCNC: 0.52 MG/DL (ref 0.57–1)
DEPRECATED RDW RBC AUTO: 50.6 FL (ref 37–54)
EGFRCR SERPLBLD CKD-EPI 2021: 121.4 ML/MIN/1.73
EOSINOPHIL # BLD AUTO: 0.01 10*3/MM3 (ref 0–0.4)
EOSINOPHIL NFR BLD AUTO: 0.2 % (ref 0.3–6.2)
ERYTHROCYTE [DISTWIDTH] IN BLOOD BY AUTOMATED COUNT: 13 % (ref 12.3–15.4)
ETHANOL BLD-MCNC: 39 MG/DL (ref 0–10)
ETHANOL UR QL: 0.04 %
FENTANYL UR-MCNC: NEGATIVE NG/ML
GLOBULIN UR ELPH-MCNC: 3.3 GM/DL
GLUCOSE SERPL-MCNC: 91 MG/DL (ref 65–99)
GLUCOSE UR STRIP-MCNC: NEGATIVE MG/DL
HCG SERPL QL: NEGATIVE
HCT VFR BLD AUTO: 39.1 % (ref 34–46.6)
HGB BLD-MCNC: 13.3 G/DL (ref 12–15.9)
HGB UR QL STRIP.AUTO: NEGATIVE
HOLD SPECIMEN: NORMAL
HOLD SPECIMEN: NORMAL
HYALINE CASTS UR QL AUTO: ABNORMAL /LPF
IMM GRANULOCYTES # BLD AUTO: 0.01 10*3/MM3 (ref 0–0.05)
IMM GRANULOCYTES NFR BLD AUTO: 0.2 % (ref 0–0.5)
KETONES UR QL STRIP: ABNORMAL
LEUKOCYTE ESTERASE UR QL STRIP.AUTO: ABNORMAL
LYMPHOCYTES # BLD AUTO: 1.43 10*3/MM3 (ref 0.7–3.1)
LYMPHOCYTES NFR BLD AUTO: 27.6 % (ref 19.6–45.3)
MCH RBC QN AUTO: 35.9 PG (ref 26.6–33)
MCHC RBC AUTO-ENTMCNC: 34 G/DL (ref 31.5–35.7)
MCV RBC AUTO: 105.7 FL (ref 79–97)
METHADONE UR QL SCN: NEGATIVE
MONOCYTES # BLD AUTO: 0.29 10*3/MM3 (ref 0.1–0.9)
MONOCYTES NFR BLD AUTO: 5.6 % (ref 5–12)
NEUTROPHILS NFR BLD AUTO: 3.42 10*3/MM3 (ref 1.7–7)
NEUTROPHILS NFR BLD AUTO: 65.8 % (ref 42.7–76)
NITRITE UR QL STRIP: NEGATIVE
NRBC BLD AUTO-RTO: 0 /100 WBC (ref 0–0.2)
OPIATES UR QL: NEGATIVE
OXYCODONE UR QL SCN: NEGATIVE
PCP UR QL SCN: NEGATIVE
PH UR STRIP.AUTO: 8.5 [PH] (ref 5–8)
PLATELET # BLD AUTO: 304 10*3/MM3 (ref 140–450)
PMV BLD AUTO: 9 FL (ref 6–12)
POTASSIUM SERPL-SCNC: 3.9 MMOL/L (ref 3.5–5.2)
PROT SERPL-MCNC: 7.6 G/DL (ref 6–8.5)
PROT UR QL STRIP: ABNORMAL
RBC # BLD AUTO: 3.7 10*6/MM3 (ref 3.77–5.28)
RBC # UR STRIP: ABNORMAL /HPF
REF LAB TEST METHOD: ABNORMAL
SODIUM SERPL-SCNC: 137 MMOL/L (ref 136–145)
SP GR UR STRIP: 1.02 (ref 1–1.03)
SQUAMOUS #/AREA URNS HPF: ABNORMAL /HPF
TRICYCLICS UR QL SCN: NEGATIVE
UROBILINOGEN UR QL STRIP: ABNORMAL
WBC # UR STRIP: ABNORMAL /HPF
WBC NRBC COR # BLD AUTO: 5.19 10*3/MM3 (ref 3.4–10.8)
WHOLE BLOOD HOLD COAG: NORMAL
WHOLE BLOOD HOLD SPECIMEN: NORMAL

## 2024-10-23 PROCEDURE — 80307 DRUG TEST PRSMV CHEM ANLYZR: CPT | Performed by: STUDENT IN AN ORGANIZED HEALTH CARE EDUCATION/TRAINING PROGRAM

## 2024-10-23 PROCEDURE — 71045 X-RAY EXAM CHEST 1 VIEW: CPT | Performed by: RADIOLOGY

## 2024-10-23 PROCEDURE — 70450 CT HEAD/BRAIN W/O DYE: CPT

## 2024-10-23 PROCEDURE — 81001 URINALYSIS AUTO W/SCOPE: CPT | Performed by: STUDENT IN AN ORGANIZED HEALTH CARE EDUCATION/TRAINING PROGRAM

## 2024-10-23 PROCEDURE — 71045 X-RAY EXAM CHEST 1 VIEW: CPT

## 2024-10-23 PROCEDURE — 70450 CT HEAD/BRAIN W/O DYE: CPT | Performed by: RADIOLOGY

## 2024-10-23 PROCEDURE — 36415 COLL VENOUS BLD VENIPUNCTURE: CPT

## 2024-10-23 PROCEDURE — 82077 ASSAY SPEC XCP UR&BREATH IA: CPT | Performed by: STUDENT IN AN ORGANIZED HEALTH CARE EDUCATION/TRAINING PROGRAM

## 2024-10-23 PROCEDURE — 84703 CHORIONIC GONADOTROPIN ASSAY: CPT | Performed by: STUDENT IN AN ORGANIZED HEALTH CARE EDUCATION/TRAINING PROGRAM

## 2024-10-23 PROCEDURE — 80053 COMPREHEN METABOLIC PANEL: CPT | Performed by: STUDENT IN AN ORGANIZED HEALTH CARE EDUCATION/TRAINING PROGRAM

## 2024-10-23 PROCEDURE — 93005 ELECTROCARDIOGRAM TRACING: CPT | Performed by: STUDENT IN AN ORGANIZED HEALTH CARE EDUCATION/TRAINING PROGRAM

## 2024-10-23 PROCEDURE — 85025 COMPLETE CBC W/AUTO DIFF WBC: CPT | Performed by: STUDENT IN AN ORGANIZED HEALTH CARE EDUCATION/TRAINING PROGRAM

## 2024-10-23 PROCEDURE — 99284 EMERGENCY DEPT VISIT MOD MDM: CPT

## 2024-10-23 RX ORDER — SODIUM CHLORIDE 0.9 % (FLUSH) 0.9 %
10 SYRINGE (ML) INJECTION AS NEEDED
Status: DISCONTINUED | OUTPATIENT
Start: 2024-10-23 | End: 2024-10-23 | Stop reason: HOSPADM

## 2024-10-23 RX ORDER — MECLIZINE HCL 12.5 MG 12.5 MG/1
25 TABLET ORAL ONCE
Status: COMPLETED | OUTPATIENT
Start: 2024-10-23 | End: 2024-10-23

## 2024-10-23 RX ORDER — MECLIZINE HYDROCHLORIDE 25 MG/1
25 TABLET ORAL 3 TIMES DAILY PRN
Qty: 20 TABLET | Refills: 0 | Status: SHIPPED | OUTPATIENT
Start: 2024-10-23

## 2024-10-23 RX ADMIN — MECLIZINE HCL 25 MG: 12.5 TABLET ORAL at 13:30

## 2024-10-23 NOTE — ED PROVIDER NOTES
Subjective   History of Present Illness  39-year-old female with past medical history of anxiety and migraines presents to the ER due to concerns for increasing dizziness and near syncope.  Patient noted no obvious syncopal episode with head trauma.  No chest pain.  No shortness of breath.  Symptoms are worsened with standing.  No obvious fever or chills.  No meningismal symptoms.  No obvious alleviating factors other than rest.  Vitals stable.  Afebrile      Review of Systems   Neurological:  Positive for dizziness.   All other systems reviewed and are negative.      Past Medical History:   Diagnosis Date    Anxiety     Migraine headache     Nongonococcal urethritis due to chlamydia trachomatis     Syncope        No Known Allergies    Past Surgical History:   Procedure Laterality Date     SECTION      DILATATION AND CURETTAGE      INGUINAL HERNIA REPAIR         Family History   Problem Relation Age of Onset    Migraines Mother     Atrial fibrillation Father     Diabetes Father     Heart disease Father     Hypertension Brother     Breast cancer Paternal Grandmother        Social History     Socioeconomic History    Marital status:    Tobacco Use    Smoking status: Former    Smokeless tobacco: Never    Tobacco comments:     Half a pack a day for 5 years.   Vaping Use    Vaping status: Never Used   Substance and Sexual Activity    Alcohol use: Yes     Comment: Social use    Drug use: No    Sexual activity: Yes     Partners: Male           Objective   Physical Exam  Constitutional:       General: She is not in acute distress.     Appearance: Normal appearance. She is not ill-appearing.   HENT:      Head: Normocephalic and atraumatic.      Right Ear: External ear normal.      Left Ear: External ear normal.      Nose: Nose normal.      Mouth/Throat:      Mouth: Mucous membranes are moist.   Eyes:      Extraocular Movements: Extraocular movements intact.      Pupils: Pupils are equal, round, and reactive to  light.   Cardiovascular:      Rate and Rhythm: Normal rate and regular rhythm.      Heart sounds: No murmur heard.  Pulmonary:      Effort: Pulmonary effort is normal. No respiratory distress.      Breath sounds: Normal breath sounds. No wheezing.   Abdominal:      General: Bowel sounds are normal.      Palpations: Abdomen is soft.      Tenderness: There is no abdominal tenderness.   Musculoskeletal:         General: No deformity or signs of injury. Normal range of motion.      Cervical back: Normal range of motion and neck supple.   Skin:     General: Skin is warm and dry.      Findings: No erythema.   Neurological:      General: No focal deficit present.      Mental Status: She is alert and oriented to person, place, and time. Mental status is at baseline.      Cranial Nerves: No cranial nerve deficit.   Psychiatric:         Mood and Affect: Mood normal.         Behavior: Behavior normal.         Thought Content: Thought content normal.         Procedures           ED Course  ED Course as of 10/23/24 1328   Wed Oct 23, 2024   1117 ECG 11:11 NSR, rate 88. Normal ECG. QT/qTc 358/433 [KEVIN]      ED Course User Index  [KEVIN] Say Little MD                                   Results for orders placed or performed during the hospital encounter of 10/23/24   Comprehensive Metabolic Panel    Collection Time: 10/23/24 12:00 PM    Specimen: Blood   Result Value Ref Range    Glucose 91 65 - 99 mg/dL    BUN 6 6 - 20 mg/dL    Creatinine 0.52 (L) 0.57 - 1.00 mg/dL    Sodium 137 136 - 145 mmol/L    Potassium 3.9 3.5 - 5.2 mmol/L    Chloride 99 98 - 107 mmol/L    CO2 19.0 (L) 22.0 - 29.0 mmol/L    Calcium 9.1 8.6 - 10.5 mg/dL    Total Protein 7.6 6.0 - 8.5 g/dL    Albumin 4.3 3.5 - 5.2 g/dL    ALT (SGPT) 43 (H) 1 - 33 U/L    AST (SGOT) 81 (H) 1 - 32 U/L    Alkaline Phosphatase 71 39 - 117 U/L    Total Bilirubin 0.6 0.0 - 1.2 mg/dL    Globulin 3.3 gm/dL    A/G Ratio 1.3 g/dL    BUN/Creatinine Ratio 11.5 7.0 - 25.0    Anion Gap  19.0 (H) 5.0 - 15.0 mmol/L    eGFR 121.4 >60.0 mL/min/1.73   hCG, Serum, Qualitative    Collection Time: 10/23/24 12:00 PM    Specimen: Blood   Result Value Ref Range    HCG Qualitative Negative Negative   Ethanol    Collection Time: 10/23/24 12:00 PM    Specimen: Blood   Result Value Ref Range    Ethanol 39 (H) 0 - 10 mg/dL    Ethanol % 0.039 %   CBC Auto Differential    Collection Time: 10/23/24 12:00 PM    Specimen: Blood   Result Value Ref Range    WBC 5.19 3.40 - 10.80 10*3/mm3    RBC 3.70 (L) 3.77 - 5.28 10*6/mm3    Hemoglobin 13.3 12.0 - 15.9 g/dL    Hematocrit 39.1 34.0 - 46.6 %    .7 (H) 79.0 - 97.0 fL    MCH 35.9 (H) 26.6 - 33.0 pg    MCHC 34.0 31.5 - 35.7 g/dL    RDW 13.0 12.3 - 15.4 %    RDW-SD 50.6 37.0 - 54.0 fl    MPV 9.0 6.0 - 12.0 fL    Platelets 304 140 - 450 10*3/mm3    Neutrophil % 65.8 42.7 - 76.0 %    Lymphocyte % 27.6 19.6 - 45.3 %    Monocyte % 5.6 5.0 - 12.0 %    Eosinophil % 0.2 (L) 0.3 - 6.2 %    Basophil % 0.6 0.0 - 1.5 %    Immature Grans % 0.2 0.0 - 0.5 %    Neutrophils, Absolute 3.42 1.70 - 7.00 10*3/mm3    Lymphocytes, Absolute 1.43 0.70 - 3.10 10*3/mm3    Monocytes, Absolute 0.29 0.10 - 0.90 10*3/mm3    Eosinophils, Absolute 0.01 0.00 - 0.40 10*3/mm3    Basophils, Absolute 0.03 0.00 - 0.20 10*3/mm3    Immature Grans, Absolute 0.01 0.00 - 0.05 10*3/mm3    nRBC 0.0 0.0 - 0.2 /100 WBC   Green Top (Gel)    Collection Time: 10/23/24 12:00 PM   Result Value Ref Range    Extra Tube Hold for add-ons.    Lavender Top    Collection Time: 10/23/24 12:00 PM   Result Value Ref Range    Extra Tube hold for add-on    Light Blue Top    Collection Time: 10/23/24 12:00 PM   Result Value Ref Range    Extra Tube Hold for add-ons.    Urinalysis With Microscopic If Indicated (No Culture) - Urine, Clean Catch    Collection Time: 10/23/24 12:20 PM    Specimen: Urine, Clean Catch   Result Value Ref Range    Color, UA Yellow Yellow, Straw    Appearance, UA Clear Clear    pH, UA 8.5 (H) 5.0 - 8.0     Specific Gravity, UA 1.016 1.005 - 1.030    Glucose, UA Negative Negative    Ketones, UA 15 mg/dL (1+) (A) Negative    Bilirubin, UA Negative Negative    Blood, UA Negative Negative    Protein, UA Trace (A) Negative    Leuk Esterase, UA Small (1+) (A) Negative    Nitrite, UA Negative Negative    Urobilinogen, UA 0.2 E.U./dL 0.2 - 1.0 E.U./dL   Urinalysis, Microscopic Only - Urine, Clean Catch    Collection Time: 10/23/24 12:20 PM    Specimen: Urine, Clean Catch   Result Value Ref Range    RBC, UA 0-2 None Seen, 0-2 /HPF    WBC, UA 11-20 (A) None Seen, 0-2 /HPF    Bacteria, UA 3+ (A) None Seen /HPF    Squamous Epithelial Cells, UA 3-6 (A) None Seen, 0-2 /HPF    Hyaline Casts, UA None Seen None Seen /LPF    Methodology Automated Microscopy    [place] Urine Culture Tube - Urine, Clean Catch    Collection Time: 10/23/24 12:20 PM    Specimen: Urine, Clean Catch   Result Value Ref Range    Extra Tube Hold for add-ons.    Urine Drug Screen - Urine, Clean Catch    Collection Time: 10/23/24 12:21 PM    Specimen: Urine, Clean Catch   Result Value Ref Range    THC, Screen, Urine Negative Negative    Phencyclidine (PCP), Urine Negative Negative    Cocaine Screen, Urine Negative Negative    Methamphetamine, Ur Negative Negative    Opiate Screen Negative Negative    Amphetamine Screen, Urine Negative Negative    Benzodiazepine Screen, Urine Negative Negative    Tricyclic Antidepressants Screen Negative Negative    Methadone Screen, Urine Negative Negative    Barbiturates Screen, Urine Negative Negative    Oxycodone Screen, Urine Negative Negative    Buprenorphine, Screen, Urine Negative Negative   Fentanyl, Urine - Urine, Clean Catch    Collection Time: 10/23/24 12:21 PM    Specimen: Urine, Clean Catch   Result Value Ref Range    Fentanyl, Urine Negative Negative         XR Chest 1 View    Result Date: 10/23/2024  No radiographic evidence of acute cardiac or pulmonary disease.    This report was finalized on 10/23/2024 12:58 PM  by Dr. Ezequiel Toro MD.      CT Head Without Contrast    Result Date: 10/23/2024    Unremarkable exam demonstrating no CT evidence of acute intracranial findings.  This report was finalized on 10/23/2024 12:57 PM by Dr. Ezequiel Toro MD.                 Medical Decision Making  CBC and CMP are unremarkable.  Urinalysis unremarkable.  UDS unremarkable.  Slightly elevated ethanol.  CT imaging of the head without contrast revealed no acute abnormality.  Symptoms improved with meclizine.  Cannot rule out vertigo.  Work up and results were discussed throughly with the patient.  The patient will be discharged for further monitoring and management with their PCP.  Red flags, warning signs, worsening symptoms, and when to return to the ER discussed with and understood by the patient.  Patient will follow up with their PCP in a timely manner.  Vitals stable at discharge.      MDM:    Escalation of care including admission/observation considered    - Discussions of management with other providers:  None    - Discussed/reviewed with Radiology regarding test interpretation    - Independent interpretation: None    - Additional patient history obtained from: None    - Review of external non-ED record (if available):  Prior Inpt record, Office record, Outpt record, Prior Outpt labs, PCP record, Outside ED record, Other    - Chronic conditions affecting care: See HPI and medical Hx.    - Social Determinants of health significantly affecting care:  None        Medical Decision Making Discussion:      The patient has been given very strict return precautions to return to the emergency department should there be any acute change or worsening of their condition.  I have explained my findings and the patient has expressed understanding to me.  I explained that the work-up performed in the ED has been based on the specific complaint and concern, as the nature of emergency medicine is complaint driven and they understand that new  symptoms may arise.  I have told them that, should there be any new symptoms, worsening or changing symptoms, a new work-up may be indicated that they are encouraged to return to the emergency department or promptly contact their primary care physician. We have employed a shared decision-making process as the discussion of their disposition.  The patient has been educated as to the nature of the visit, the tests and work-up performed and the findings from today's visit. At this time, there does not appear to be any acute emergent process that necessitates admission to the hospital, however, the patient understands that this can change unexpectedly. At this time, the patient is stable for discharge home and agrees to follow-up with her primary care physician in the next 24 to 48 hours or earlier should they be able to obtain an appointment.    The patient was counseled regarding diagnostic results and treatment plan and patient has indicated understanding of these instructions.    Please follow up with your primary care physician in the next 1-3 days. If this is not possible, please return to the ED for re-evaluation.    It is IMPORTANT to see your DOCTOR OR PRIMARY CARE PROVIDER. Emergency Care may be incomplete without proper follow-up.  Your evaluation in the emergency department is focused on a specific clinical complaint, at a given moment in time.  Symptoms sometimes change or new symptoms might arise after you leave the Emergency Department. It is important that you call your doctor if you become worse in any way, or promptly return to the Emergency Department should any new, or worsening/changing symptom occur.  You are strongly urged to follow-up with your physician to assure complete and thorough care. PLEASE CALL YOUR DOCTORS OFFICE TODAY, INFORM THEM THAT YOU WERE SEEN IN THE EMERGENCY DEPARTMENT, AND THAT YOU NEED TO BE SEEN IMMEDIATELY FOR CLOSE FOLLOW UP.  If you do not have a primary care doctor we  encourage you to proactively seek a local physician for close follow up.  Consider local clinics, free or sliding scale clinics, local [facility].  You can always return to the Emergency Department if you are having difficulty coordinating close follow up.  If medications were prescribed you should fill them at your local pharmacy immediately and take only as prescribed.  Bring your new medications to your doctors follow up visit to discuss any changes that would be necessary. RETURN TO THE EMERGENCY DEPARTMENT IMMEDIATELY FOR ANY NEW OR WORSENING SYMPTOMS.    ADDITIONAL DISCHARGE INSTRUCTIONS:     - If you received IV contrast today with a CT or MRI please stay well hydrated for the next 48-72 hours to protect your kidneys.    - If you have been prescribed an antibiotic, taking an over the counter probiotic may help with gastrointestinal side effects and antibiotic associated diarrhea.    - Return to the emergency department or seek immediate medical care if any of the following occur:           - Symptoms do not improve in 8-12 hours. If this occurs, please return to the emergency department for re-evaluation or your symptoms or repeat abdominal examination         - Symptoms worsen at any time.         - If you are unable to follow up with a medical provider as instructed.         - You develop any worrisome symptoms such as fever, chills, uncontrolled pain, change or worsening of your pain symptoms, intractable vomiting, uncontrolled diarrhea, blood in your stool, dark/tarry stool, new neurological symptoms etc.    If you have been prescribed a narcotic pain medication, please take a stool softener to prevent constipation.     During your ED visit, you may have been given narcotics (such as morphine, dilaudid, percocet, vicodin) or sedatives (such as ativan, versed). These medications can impair your reflexes so, DO NOT DRIVE or USE ANY MACHINERY for at least 6 hours if you have been given these  medications.    REMINDER FOR METFORMIN USERS: If you are using metformin (also known as Glucophage) and if you received a CT scan in which you received IV contrast, you must hold your metformin for a total of 72 hrs.  This will prevent any adverse interactions between the two agents.            Amount and/or Complexity of Data Reviewed  Labs: ordered. Decision-making details documented in ED Course.  Radiology: ordered. Decision-making details documented in ED Course.    Risk  Prescription drug management.        Final diagnoses:   Vertigo       ED Disposition  ED Disposition       ED Disposition   Discharge    Condition   Stable    Comment   --               Mony Manriquez MD  [address]  [address]  [facility] [id number] [phone]    In 1 week      [facility] EMERGENCY DEPARTMENT  [address] [place] [id number] [phone]    If symptoms worsen         Medication List        New Prescriptions      meclizine 25 MG tablet  Commonly known as: ANTIVERT  Take 1 tablet by mouth 3 (Three) Times a Day As Needed for Dizziness.               Where to Get Your Medications        These medications were sent to [facility] - [place], [place] - [address] [address] - [phone]  - [phone]   [address] [facility] [address], [facility] [address]      Phone: [phone]   meclizine 25 MG tablet            Oumar Gallegos DO  10/23/24 5481     98.4

## 2024-10-25 LAB
QT INTERVAL: 358 MS
QTC INTERVAL: 433 MS

## 2024-12-18 ENCOUNTER — HOSPITAL ENCOUNTER (EMERGENCY)
Facility: HOSPITAL | Age: 39
Discharge: HOME | End: 2024-12-18
Attending: STUDENT IN AN ORGANIZED HEALTH CARE EDUCATION/TRAINING PROGRAM | Admitting: STUDENT IN AN ORGANIZED HEALTH CARE EDUCATION/TRAINING PROGRAM
Payer: MEDICAID

## 2024-12-18 VITALS
SYSTOLIC BLOOD PRESSURE: 139 MMHG | OXYGEN SATURATION: 100 % | HEIGHT: 69 IN | BODY MASS INDEX: 25.92 KG/M2 | RESPIRATION RATE: 18 BRPM | TEMPERATURE: 97.5 F | DIASTOLIC BLOOD PRESSURE: 90 MMHG | WEIGHT: 175 LBS | HEART RATE: 88 BPM

## 2024-12-18 DIAGNOSIS — F41.0 PANIC DISORDER: Primary | ICD-10-CM

## 2024-12-18 LAB
ALBUMIN SERPL-MCNC: 4.3 G/DL (ref 3.5–5.2)
ALBUMIN/GLOB SERPL: 1.5 G/DL
ALP SERPL-CCNC: 65 U/L (ref 39–117)
ALT SERPL W P-5'-P-CCNC: 29 U/L (ref 1–33)
AMPHET+METHAMPHET UR QL: NEGATIVE
AMPHETAMINES UR QL: NEGATIVE
ANION GAP SERPL CALCULATED.3IONS-SCNC: 17.8 MMOL/L (ref 5–15)
AST SERPL-CCNC: 36 U/L (ref 1–32)
BACTERIA UR QL AUTO: ABNORMAL /HPF
BARBITURATES UR QL SCN: NEGATIVE
BASOPHILS # BLD AUTO: 0.05 10*3/MM3 (ref 0–0.2)
BASOPHILS NFR BLD AUTO: 0.8 % (ref 0–1.5)
BENZODIAZ UR QL SCN: NEGATIVE
BILIRUB SERPL-MCNC: 0.3 MG/DL (ref 0–1.2)
BILIRUB UR QL STRIP: NEGATIVE
BUN SERPL-MCNC: 8 MG/DL (ref 6–20)
BUN/CREAT SERPL: 13.6 (ref 7–25)
BUPRENORPHINE SERPL-MCNC: NEGATIVE NG/ML
CALCIUM SPEC-SCNC: 9.1 MG/DL (ref 8.6–10.5)
CANNABINOIDS SERPL QL: NEGATIVE
CHLORIDE SERPL-SCNC: 98 MMOL/L (ref 98–107)
CLARITY UR: CLEAR
CO2 SERPL-SCNC: 20.2 MMOL/L (ref 22–29)
COCAINE UR QL: NEGATIVE
COLOR UR: YELLOW
CREAT SERPL-MCNC: 0.59 MG/DL (ref 0.57–1)
DEPRECATED RDW RBC AUTO: 45.9 FL (ref 37–54)
EGFRCR SERPLBLD CKD-EPI 2021: 117.7 ML/MIN/1.73
EOSINOPHIL # BLD AUTO: 0.25 10*3/MM3 (ref 0–0.4)
EOSINOPHIL NFR BLD AUTO: 4.1 % (ref 0.3–6.2)
ERYTHROCYTE [DISTWIDTH] IN BLOOD BY AUTOMATED COUNT: 12.3 % (ref 12.3–15.4)
FENTANYL UR-MCNC: NEGATIVE NG/ML
GLOBULIN UR ELPH-MCNC: 2.9 GM/DL
GLUCOSE SERPL-MCNC: 101 MG/DL (ref 65–99)
GLUCOSE UR STRIP-MCNC: NEGATIVE MG/DL
HCT VFR BLD AUTO: 35.2 % (ref 34–46.6)
HGB BLD-MCNC: 12.2 G/DL (ref 12–15.9)
HGB UR QL STRIP.AUTO: NEGATIVE
HOLD SPECIMEN: NORMAL
HOLD SPECIMEN: NORMAL
HYALINE CASTS UR QL AUTO: ABNORMAL /LPF
IMM GRANULOCYTES # BLD AUTO: 0.02 10*3/MM3 (ref 0–0.05)
IMM GRANULOCYTES NFR BLD AUTO: 0.3 % (ref 0–0.5)
KETONES UR QL STRIP: NEGATIVE
LEUKOCYTE ESTERASE UR QL STRIP.AUTO: ABNORMAL
LYMPHOCYTES # BLD AUTO: 2.12 10*3/MM3 (ref 0.7–3.1)
LYMPHOCYTES NFR BLD AUTO: 34.8 % (ref 19.6–45.3)
MAGNESIUM SERPL-MCNC: 1.6 MG/DL (ref 1.6–2.6)
MCH RBC QN AUTO: 35.3 PG (ref 26.6–33)
MCHC RBC AUTO-ENTMCNC: 34.7 G/DL (ref 31.5–35.7)
MCV RBC AUTO: 101.7 FL (ref 79–97)
METHADONE UR QL SCN: NEGATIVE
MONOCYTES # BLD AUTO: 0.46 10*3/MM3 (ref 0.1–0.9)
MONOCYTES NFR BLD AUTO: 7.5 % (ref 5–12)
NEUTROPHILS NFR BLD AUTO: 3.2 10*3/MM3 (ref 1.7–7)
NEUTROPHILS NFR BLD AUTO: 52.5 % (ref 42.7–76)
NITRITE UR QL STRIP: NEGATIVE
NRBC BLD AUTO-RTO: 0 /100 WBC (ref 0–0.2)
OPIATES UR QL: NEGATIVE
OXYCODONE UR QL SCN: NEGATIVE
PCP UR QL SCN: NEGATIVE
PH UR STRIP.AUTO: 6.5 [PH] (ref 5–8)
PLATELET # BLD AUTO: 277 10*3/MM3 (ref 140–450)
PMV BLD AUTO: 9.2 FL (ref 6–12)
POTASSIUM SERPL-SCNC: 3 MMOL/L (ref 3.5–5.2)
PROT SERPL-MCNC: 7.2 G/DL (ref 6–8.5)
PROT UR QL STRIP: NEGATIVE
RBC # BLD AUTO: 3.46 10*6/MM3 (ref 3.77–5.28)
RBC # UR STRIP: ABNORMAL /HPF
REF LAB TEST METHOD: ABNORMAL
SODIUM SERPL-SCNC: 136 MMOL/L (ref 136–145)
SP GR UR STRIP: <=1.005 (ref 1–1.03)
SQUAMOUS #/AREA URNS HPF: ABNORMAL /HPF
TRICYCLICS UR QL SCN: NEGATIVE
TROPONIN T SERPL HS-MCNC: <6 NG/L
UROBILINOGEN UR QL STRIP: ABNORMAL
WBC # UR STRIP: ABNORMAL /HPF
WBC NRBC COR # BLD AUTO: 6.1 10*3/MM3 (ref 3.4–10.8)
WHOLE BLOOD HOLD COAG: NORMAL
WHOLE BLOOD HOLD SPECIMEN: NORMAL

## 2024-12-18 PROCEDURE — 80053 COMPREHEN METABOLIC PANEL: CPT | Performed by: STUDENT IN AN ORGANIZED HEALTH CARE EDUCATION/TRAINING PROGRAM

## 2024-12-18 PROCEDURE — 81001 URINALYSIS AUTO W/SCOPE: CPT | Performed by: STUDENT IN AN ORGANIZED HEALTH CARE EDUCATION/TRAINING PROGRAM

## 2024-12-18 PROCEDURE — 36415 COLL VENOUS BLD VENIPUNCTURE: CPT

## 2024-12-18 PROCEDURE — 99283 EMERGENCY DEPT VISIT LOW MDM: CPT

## 2024-12-18 PROCEDURE — 84484 ASSAY OF TROPONIN QUANT: CPT | Performed by: STUDENT IN AN ORGANIZED HEALTH CARE EDUCATION/TRAINING PROGRAM

## 2024-12-18 PROCEDURE — 93005 ELECTROCARDIOGRAM TRACING: CPT | Performed by: STUDENT IN AN ORGANIZED HEALTH CARE EDUCATION/TRAINING PROGRAM

## 2024-12-18 PROCEDURE — 80307 DRUG TEST PRSMV CHEM ANLYZR: CPT | Performed by: STUDENT IN AN ORGANIZED HEALTH CARE EDUCATION/TRAINING PROGRAM

## 2024-12-18 PROCEDURE — 36415 COLL VENOUS BLD VENIPUNCTURE: CPT | Performed by: STUDENT IN AN ORGANIZED HEALTH CARE EDUCATION/TRAINING PROGRAM

## 2024-12-18 PROCEDURE — 83735 ASSAY OF MAGNESIUM: CPT | Performed by: STUDENT IN AN ORGANIZED HEALTH CARE EDUCATION/TRAINING PROGRAM

## 2024-12-18 PROCEDURE — 85025 COMPLETE CBC W/AUTO DIFF WBC: CPT | Performed by: STUDENT IN AN ORGANIZED HEALTH CARE EDUCATION/TRAINING PROGRAM

## 2024-12-18 RX ORDER — BUSPIRONE HYDROCHLORIDE 10 MG/1
10 TABLET ORAL ONCE
Status: COMPLETED | OUTPATIENT
Start: 2024-12-18 | End: 2024-12-18

## 2024-12-18 RX ORDER — SODIUM CHLORIDE 0.9 % (FLUSH) 0.9 %
10 SYRINGE (ML) INJECTION AS NEEDED
Status: DISCONTINUED | OUTPATIENT
Start: 2024-12-18 | End: 2024-12-18 | Stop reason: HOSPADM

## 2024-12-18 RX ORDER — ESCITALOPRAM OXALATE 10 MG/1
10 TABLET ORAL ONCE
Status: COMPLETED | OUTPATIENT
Start: 2024-12-18 | End: 2024-12-18

## 2024-12-18 RX ORDER — ESCITALOPRAM OXALATE 10 MG/1
10 TABLET ORAL DAILY
Qty: 30 TABLET | Refills: 0 | Status: SHIPPED | OUTPATIENT
Start: 2024-12-18 | End: 2025-01-17

## 2024-12-18 RX ORDER — BUSPIRONE HYDROCHLORIDE 10 MG/1
10 TABLET ORAL 3 TIMES DAILY
Qty: 90 TABLET | Refills: 0 | Status: SHIPPED | OUTPATIENT
Start: 2024-12-18 | End: 2025-01-17

## 2024-12-18 RX ORDER — CLONAZEPAM 1 MG/1
1 TABLET ORAL 3 TIMES DAILY PRN
Qty: 9 TABLET | Refills: 0 | Status: SHIPPED | OUTPATIENT
Start: 2024-12-18

## 2024-12-18 RX ORDER — POTASSIUM CHLORIDE 1500 MG/1
40 TABLET, EXTENDED RELEASE ORAL ONCE
Status: COMPLETED | OUTPATIENT
Start: 2024-12-18 | End: 2024-12-18

## 2024-12-18 RX ADMIN — BUSPIRONE HYDROCHLORIDE 10 MG: 10 TABLET ORAL at 18:11

## 2024-12-18 RX ADMIN — ESCITALOPRAM OXALATE 10 MG: 10 TABLET ORAL at 18:11

## 2024-12-18 RX ADMIN — POTASSIUM CHLORIDE 40 MEQ: 20 TABLET, EXTENDED RELEASE ORAL at 18:11

## 2024-12-18 NOTE — ED PROVIDER NOTES
Subjective   History of Present Illness  Patient is a 39-year-old female with history significant for anxiety/panic disorder who comes to the ER with complaints of chest pain and palpitations.  She states she has been on multiple medications in the past and had been on Ativan 2 mg as needed for panic disorders in the past but weaned herself off about 3 months ago.  She states the physician she used to follow with retired and she stopped seeing anyone for her anxiety.  She stated she did not want to be dependent on any medication which is why she weaned herself off Ativan.  She states her anxiety usually worsens when she drives which is when the chest pain started today.  She feels a lot better now but felt chest tightness, jittery, short of breath and anxious which prompted presentation to the ER today.  She had her mother drive her to the ER.  She has been on multiple medications in the past but does not recall why she is no longer takes them.      Review of Systems   Constitutional:  Negative for chills, fatigue and fever.   HENT:  Negative for ear pain, sinus pain and sore throat.    Respiratory:  Positive for shortness of breath. Negative for cough, chest tightness and wheezing.    Cardiovascular:  Positive for chest pain and palpitations. Negative for leg swelling.   Gastrointestinal:  Negative for abdominal pain, constipation, diarrhea, nausea and vomiting.   Genitourinary:  Negative for dysuria, hematuria and urgency.   Musculoskeletal:  Negative for arthralgias and myalgias.   Neurological:  Negative for dizziness, syncope and light-headedness.   Psychiatric/Behavioral:  Negative for confusion.        Past Medical History:   Diagnosis Date    Anxiety     Migraine headache     Nongonococcal urethritis due to chlamydia trachomatis     Syncope        No Known Allergies    Past Surgical History:   Procedure Laterality Date     SECTION      DILATATION AND CURETTAGE      INGUINAL HERNIA REPAIR          Family History   Problem Relation Age of Onset    Migraines Mother     Atrial fibrillation Father     Diabetes Father     Heart disease Father     Hypertension Brother     Breast cancer Paternal Grandmother        Social History     Socioeconomic History    Marital status:    Tobacco Use    Smoking status: Former    Smokeless tobacco: Never    Tobacco comments:     Half a pack a day for 5 years.   Vaping Use    Vaping status: Never Used   Substance and Sexual Activity    Alcohol use: Yes     Comment: Social use    Drug use: No    Sexual activity: Yes     Partners: Male           Objective   Physical Exam  Vitals and nursing note reviewed. Exam conducted with a chaperone present.   Constitutional:       Appearance: Normal appearance. She is normal weight.   HENT:      Head: Normocephalic and atraumatic.      Nose: Nose normal.      Mouth/Throat:      Mouth: Mucous membranes are moist.      Pharynx: Oropharynx is clear.   Eyes:      Extraocular Movements: Extraocular movements intact.      Conjunctiva/sclera: Conjunctivae normal.      Pupils: Pupils are equal, round, and reactive to light.   Cardiovascular:      Rate and Rhythm: Normal rate and regular rhythm.      Pulses: Normal pulses.      Heart sounds: Normal heart sounds.   Pulmonary:      Effort: Pulmonary effort is normal.      Breath sounds: Normal breath sounds.   Abdominal:      General: Bowel sounds are normal.      Palpations: Abdomen is soft.   Musculoskeletal:         General: Normal range of motion.      Cervical back: Normal range of motion and neck supple.   Skin:     General: Skin is warm and dry.      Capillary Refill: Capillary refill takes less than 2 seconds.   Neurological:      General: No focal deficit present.      Mental Status: She is alert and oriented to person, place, and time.   Psychiatric:         Mood and Affect: Mood normal.         Behavior: Behavior normal.         Procedures           ED Course  ED Course as of  12/18/24 1801   Wed Dec 18, 2024   1622 EKG notes sinus rhythm.  96 bpm.  I directly evaluated the EKG of this patient and noted no acute abnormalities.  Electronically signed by Oumar Gallegos DO, 12/18/24, 4:22 PM EST.   [SF]      ED Course User Index  [SF] Oumar Gallegos DO                                                       Medical Decision Making  -- Labs unremarkable  --EKG nonischemic  --Patient has significant panic disorder, discussed treatment options with her.  Will start Lexapro 10 mg daily with BuSpar 10 mg 3 times daily as needed  --Will give as needed Klonopin  --Information given to schedule an outpatient appointment with the [facility]    Problems Addressed:  Panic disorder: complicated acute illness or injury    Amount and/or Complexity of Data Reviewed  Labs: ordered.  ECG/medicine tests: ordered.    Risk  Prescription drug management.        Final diagnoses:   Panic disorder       ED Disposition  ED Disposition       ED Disposition   Discharge    Condition   Stable    Comment   --               Provider, No Known  [facility]  [place] [address]  [phone]    In 1 week      [facility]  [address] [place] [id number] [phone]  Call            Medication List        New Prescriptions      busPIRone 10 MG tablet  Commonly known as: BUSPAR  Take 1 tablet by mouth 3 (Three) Times a Day for 30 days.     clonazePAM 1 MG tablet  Commonly known as: KlonoPIN  Take 1 tablet by mouth 3 (Three) Times a Day As Needed for Anxiety (panic disorder).            Changed      escitalopram 10 MG tablet  Commonly known as: LEXAPRO  Take 1 tablet by mouth Daily for 30 days.  What changed:   how much to take  how to take this  when to take this  additional instructions               Where to Get Your Medications        These medications were sent to [id number] - [place], [place] - [address] - [phone]  - [phone]  [address] [place] [address]      Phone: [phone]   busPIRone 10 MG tablet  clonazePAM 1 MG tablet  escitalopram 10 MG tablet            Tereso Street DO  12/18/24 9630

## 2024-12-18 NOTE — ED TRIAGE NOTES
MEDICAL SCREENING:    Reason for Visit: Palpitations    Patient initially seen in triage.  The patient was advised further evaluation and diagnostic testing will be needed, some of the treatment and testing will be initiated in the lobby in order to begin the process.  The patient will be returned to the waiting area for the time being and possibly be re-assessed by a subsequent ED provider.  The patient will be brought back to the treatment area in as timely manner as possible.

## 2024-12-20 LAB
QT INTERVAL: 360 MS
QTC INTERVAL: 454 MS

## 2025-01-21 ENCOUNTER — OFFICE VISIT (OUTPATIENT)
Dept: PSYCHIATRY | Facility: CLINIC | Age: 40
End: 2025-01-21
Payer: MEDICAID

## 2025-01-21 VITALS
BODY MASS INDEX: 25.12 KG/M2 | WEIGHT: 169.6 LBS | HEIGHT: 69 IN | OXYGEN SATURATION: 99 % | DIASTOLIC BLOOD PRESSURE: 78 MMHG | SYSTOLIC BLOOD PRESSURE: 118 MMHG | HEART RATE: 70 BPM

## 2025-01-21 DIAGNOSIS — F41.1 GENERALIZED ANXIETY DISORDER: ICD-10-CM

## 2025-01-21 DIAGNOSIS — Z79.899 MEDICATION MANAGEMENT: Primary | ICD-10-CM

## 2025-01-21 LAB
AMPHET+METHAMPHET UR QL: NEGATIVE
AMPHETAMINES UR QL: NEGATIVE
BARBITURATES UR QL SCN: NEGATIVE
BENZODIAZ UR QL SCN: NEGATIVE
BUPRENORPHINE SERPL-MCNC: NEGATIVE NG/ML
CANNABINOIDS SERPL QL: NEGATIVE
COCAINE UR QL: NEGATIVE
MDMA UR QL SCN: NEGATIVE
METHADONE UR QL SCN: NEGATIVE
MORPHINE/OPIATES SCREEN, URINE: NEGATIVE
OXYCODONE UR QL SCN: NEGATIVE
PCP UR QL SCN: NEGATIVE
PROPOXYPH UR QL SCN: NEGATIVE
TRICYCLICS UR QL SCN: NEGATIVE

## 2025-01-21 RX ORDER — ESCITALOPRAM OXALATE 20 MG/1
20 TABLET ORAL DAILY
Qty: 30 TABLET | Refills: 1 | Status: SHIPPED | OUTPATIENT
Start: 2025-01-21

## 2025-01-21 RX ORDER — LORAZEPAM 1 MG/1
1 TABLET ORAL 2 TIMES DAILY PRN
Qty: 30 TABLET | Refills: 0 | Status: SHIPPED | OUTPATIENT
Start: 2025-01-21

## 2025-01-21 NOTE — PROGRESS NOTES
"  Subjective     Coco Mahoney is a 39 y.o. female who presents today for initial evaluation     Chief Complaint: anxiety       History of Present Illness:    History of Present Illness  Coco is a 39-year-old female who presents today for an initial evaluation with me.  She was referred here by an ER provider after presenting to the ED recently with panic.  In the ED she was started on BuSpar, Lexapro 10 mg daily, and as needed Klonopin.   she has seen PARAMJIT Recinos in the past for medication management and treatment of her anxiety.  She states that she used to cope here as a child and saw a therapist.  Per review of past notes, patient has had significant trouble with managing anxiety symptoms and reports having several adverse effects of different medications.  She has tried several medications including Ativan, Lexapro, propranolol, Celexa, Xanax, Wellbutrin (caused depression).  She states that she had taken Ativan for several years and was unaware that she could become dependent and would have withdrawal side effects if stopping abruptly.  She states that she decided to taper off and reports not wanting to be reliant on a medication but feels like it did help her panic and times of extreme distress.  She states that she has had anxiety her entire life and has felt minimal relief with medications.  She reports being unable to drive without extreme anxiety.  Denies any previous traumatic events involving vehicles or driving but states that her first severe panic attack was while she was driving several years ago.  She tells me that her sleep is poor and wakes up several times throughout the night.  She reports having \"weird\" dreams but denies having any nightmares.  She has a son who is 12 years old who has autism and requires support.  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  She states that she did struggle with infertility and states that her son was a twin.  States " "that they were concerned about him not walking which led to his surprising diagnosis of autism.  This did cause a lot of anxiety as well.  She denies any OCD symptoms.  Denies feeling down or depressed.  Her appetite is good and describes herself as a \"grazer.\"  Jhony and concentration are good and states that she does better when she stays busy.  Some days she does not have a lot of energy and has recently started a B12 supplement.  She reports having frequent mood swings and was having perimenopausal symptoms but states she started taking over-the-counter Estroven and states this has helped significantly.  Denies any SI/HI/AVH.       The following portions of the patient's history were reviewed and updated as appropriate: allergies, current medications, past family history, past medical history, past social history, past surgical history and problem list.    Past Psychiatric History:  Began Treatment: Early 20's   Diagnoses:Anxiety and panic disorder  Psychiatrist: PARAMJIT Samuel  Therapist: as a child  Admission History:Denies  Medication Trials: Lexapro, Celexa, propranolol, Wellbutrin (more depression), Ativan, Xanax , hydroxyzine, buspar, paxil, Elavil, klonopin,   Self Harm: Denies  Suicide Attempts:Denies   Psychosis, Anxiety, Depression: Denies    Past Medical History:  Past Medical History:   Diagnosis Date    Anxiety     Migraine headache     Nongonococcal urethritis due to chlamydia trachomatis     Syncope        Substance Abuse History:   Prescribed benzo's.    Social History:  Social History     Socioeconomic History    Marital status:    Tobacco Use    Smoking status: Former    Smokeless tobacco: Never    Tobacco comments:     Half a pack a day for 5 years.   Vaping Use    Vaping status: Never Used   Substance and Sexual Activity    Alcohol use: Yes     Comment: Social use    Drug use: No    Sexual activity: Yes     Partners: Male       Family History:  Family History   Problem " Relation Age of Onset    Migraines Mother     Atrial fibrillation Father     Diabetes Father     Heart disease Father     Hypertension Brother     Breast cancer Paternal Grandmother        Past Surgical History:  Past Surgical History:   Procedure Laterality Date     SECTION      DILATATION AND CURETTAGE      INGUINAL HERNIA REPAIR         Problem List:  Patient Active Problem List   Diagnosis    Didelphic uterus    Generalized anxiety disorder    Panic disorder    Migraine with aura and without status migrainosus, not intractable       Allergy:   No Known Allergies     Current Medications:   Current Outpatient Medications   Medication Sig Dispense Refill    escitalopram (LEXAPRO) 20 MG tablet Take 1 tablet by mouth Daily. 30 tablet 1    multivitamin with minerals tablet tablet Take 1 tablet by mouth Daily.      busPIRone (BUSPAR) 10 MG tablet Take 1 tablet by mouth 3 (Three) Times a Day for 30 days. 90 tablet 0    LORazepam (Ativan) 1 MG tablet Take 1 tablet by mouth 2 (Two) Times a Day As Needed for Anxiety. 30 tablet 0     No current facility-administered medications for this visit.       Review of Systems:    Review of Systems   Constitutional:  Positive for fatigue.   Psychiatric/Behavioral:  Positive for sleep disturbance. The patient is nervous/anxious.          Physical Exam:   Physical Exam  Vitals reviewed.   Constitutional:       Appearance: Normal appearance.   Musculoskeletal:      Cervical back: Normal range of motion.   Neurological:      Mental Status: She is alert and oriented to person, place, and time. Mental status is at baseline.   Psychiatric:         Attention and Perception: Attention and perception normal.         Mood and Affect: Affect normal. Mood is anxious.         Speech: Speech normal.         Behavior: Behavior normal. Behavior is cooperative.         Thought Content: Thought content normal.         Cognition and Memory: Cognition and memory normal.         Judgment: Judgment  "normal.         Vitals:  Blood pressure 118/78, pulse 70, height 175.3 cm (69\"), weight 76.9 kg (169 lb 9.6 oz), SpO2 99%.   Body mass index is 25.05 kg/m².    Last 3 Blood Pressure Readings:  BP Readings from Last 3 Encounters:   01/21/25 118/78   12/18/24 139/90   10/23/24 143/87       PHQ-9 Score:   PHQ-9 Total Score: PHQ-9 Depression Screening  Little interest or pleasure in doing things? Not at all   Feeling down, depressed, or hopeless? Not at all   PHQ-2 Total Score 0   Trouble falling or staying asleep, or sleeping too much? Almost all   Feeling tired or having little energy? Several days   Poor appetite or overeating? Several days   Feeling bad about yourself - or that you are a failure or have let yourself or your family down? Several days   Trouble concentrating on things, such as reading the newspaper or watching television? Not at all   Moving or speaking so slowly that other people could have noticed? Or the opposite - being so fidgety or restless that you have been moving around a lot more than usual? Not at all   Thoughts that you would be better off dead, or of hurting yourself in some way? Not at all   PHQ-9 Total Score 6   If you checked off any problems, how difficult have these problems made it for you to do your work, take care of things at home, or get along with other people? Somewhat difficult             Mental Status Exam:   Hygiene:   good  Cooperation:  Cooperative  Eye Contact:  Good  Psychomotor Behavior:  Restless  Affect:  Full range  Mood: normal and anxious  Hopelessness: Denies  Speech:  Normal  Thought Process:  Linear  Thought Content:  Normal  Suicidal:  None  Homicidal:  None  Hallucinations:  None  Delusion:  None  Memory:  Intact  Orientation:  Person, Place, Time, and Situation  Reliability:  good  Insight:  Fair  Judgement:  Fair  Impulse Control:  Fair  Physical/Medical Issues:  Yes see [facility]      Lab Results:   Office Visit on 01/21/2025   Component Date Value Ref Range " Status    Amphetamine Screen, Urine 01/21/2025 Negative  Negative Final    Referto confirmation by [facility] in scanned documents    Barbiturates Screen, Urine 01/21/2025 Negative  Negative Final    Buprenorphine, Screen, Urine 01/21/2025 Negative  Negative Final    Benzodiazepine Screen, Urine 01/21/2025 Negative  Negative Final    Cocaine Screen, Urine 01/21/2025 Negative  Negative Final    MDMA (ECSTASY) 01/21/2025 Negative  Negative Final    Methamphetamine, Ur 01/21/2025 Negative  Negative Final    Methadone Screen, Urine 01/21/2025 Negative  Negative Final    Morphine/Opiates Screen, Urine 01/21/2025 Negative  Negative Final    Oxycodone Screen, Urine 01/21/2025 Negative  Negative Final    Phencyclidine (PCP), Urine 01/21/2025 Negative  Negative Final    Propoxyphene Scree, Urine 01/21/2025 Negative  Negative Final    Tricyclic Antidepressants Screen 01/21/2025 Negative  Negative Final    THC, Screen, Urine 01/21/2025 Negative  Negative Final   Admission on 12/18/2024, Discharged on 12/18/2024   Component Date Value Ref Range Status    QT Interval 12/18/2024 360  ms Final    QTC Interval 12/18/2024 454  ms Final    Glucose 12/18/2024 101 (H)  65 - 99 mg/dL Final    BUN 12/18/2024 8  6 - 20 mg/dL Final    Creatinine 12/18/2024 0.59  0.57 - 1.00 mg/dL Final    Sodium 12/18/2024 136  136 - 145 mmol/L Final    Potassium 12/18/2024 3.0 (L)  3.5 - 5.2 mmol/L Final    Chloride 12/18/2024 98  98 - 107 mmol/L Final    CO2 12/18/2024 20.2 (L)  22.0 - 29.0 mmol/L Final    Calcium 12/18/2024 9.1  8.6 - 10.5 mg/dL Final    Total Protein 12/18/2024 7.2  6.0 - 8.5 g/dL Final    Albumin 12/18/2024 4.3  3.5 - 5.2 g/dL Final    ALT (SGPT) 12/18/2024 29  1 - 33 U/L Final    AST (SGOT) 12/18/2024 36 (H)  1 - 32 U/L Final    Alkaline Phosphatase 12/18/2024 65  39 - 117 U/L Final    Total Bilirubin 12/18/2024 0.3  0.0 - 1.2 mg/dL Final    Globulin 12/18/2024 2.9  gm/dL Final    A/G Ratio 12/18/2024 1.5  g/dL Final    BUN/Creatinine  Ratio 12/18/2024 13.6  7.0 - 25.0 Final    Anion Gap 12/18/2024 17.8 (H)  5.0 - 15.0 mmol/L Final    eGFR 12/18/2024 117.7  >60.0 mL/min/1.73 Final    HS Troponin T 12/18/2024 <6  <14 ng/L Final    Extra Tube 12/18/2024 Hold for add-ons.   Final    Auto resulted.    Extra Tube 12/18/2024 hold for add-on   Final    Auto resulted    Extra Tube 12/18/2024 Hold for add-ons.   Final    Auto resulted.    Extra Tube 12/18/2024 Hold for add-ons.   Final    Auto resulted    WBC 12/18/2024 6.10  3.40 - 10.80 10*3/mm3 Final    RBC 12/18/2024 3.46 (L)  3.77 - 5.28 10*6/mm3 Final    Hemoglobin 12/18/2024 12.2  12.0 - 15.9 g/dL Final    Hematocrit 12/18/2024 35.2  34.0 - 46.6 % Final    MCV 12/18/2024 101.7 (H)  79.0 - 97.0 fL Final    MCH 12/18/2024 35.3 (H)  26.6 - 33.0 pg Final    MCHC 12/18/2024 34.7  31.5 - 35.7 g/dL Final    RDW 12/18/2024 12.3  12.3 - 15.4 % Final    RDW-SD 12/18/2024 45.9  37.0 - 54.0 fl Final    MPV 12/18/2024 9.2  6.0 - 12.0 fL Final    Platelets 12/18/2024 277  140 - 450 10*3/mm3 Final    Neutrophil % 12/18/2024 52.5  42.7 - 76.0 % Final    Lymphocyte % 12/18/2024 34.8  19.6 - 45.3 % Final    Monocyte % 12/18/2024 7.5  5.0 - 12.0 % Final    Eosinophil % 12/18/2024 4.1  0.3 - 6.2 % Final    Basophil % 12/18/2024 0.8  0.0 - 1.5 % Final    Immature Grans % 12/18/2024 0.3  0.0 - 0.5 % Final    Neutrophils, Absolute 12/18/2024 3.20  1.70 - 7.00 10*3/mm3 Final    Lymphocytes, Absolute 12/18/2024 2.12  0.70 - 3.10 10*3/mm3 Final    Monocytes, Absolute 12/18/2024 0.46  0.10 - 0.90 10*3/mm3 Final    Eosinophils, Absolute 12/18/2024 0.25  0.00 - 0.40 10*3/mm3 Final    Basophils, Absolute 12/18/2024 0.05  0.00 - 0.20 10*3/mm3 Final    Immature Grans, Absolute 12/18/2024 0.02  0.00 - 0.05 10*3/mm3 Final    nRBC 12/18/2024 0.0  0.0 - 0.2 /100 WBC Final    Color, UA 12/18/2024 Yellow  Yellow, Straw Final    Appearance, UA 12/18/2024 Clear  Clear Final    pH, UA 12/18/2024 6.5  5.0 - 8.0 Final    Specific Gravity, UA  12/18/2024 <=1.005  1.005 - 1.030 Final    Glucose, UA 12/18/2024 Negative  Negative Final    Ketones, UA 12/18/2024 Negative  Negative Final    Bilirubin, UA 12/18/2024 Negative  Negative Final    Blood, UA 12/18/2024 Negative  Negative Final    Protein, UA 12/18/2024 Negative  Negative Final    Leuk Esterase, UA 12/18/2024 Small (1+) (A)  Negative Final    Nitrite, UA 12/18/2024 Negative  Negative Final    Urobilinogen, UA 12/18/2024 0.2 E.U./dL  0.2 - 1.0 E.U./dL Final    THC, Screen, Urine 12/18/2024 Negative  Negative Final    Phencyclidine (PCP), Urine 12/18/2024 Negative  Negative Final    Cocaine Screen, Urine 12/18/2024 Negative  Negative Final    Methamphetamine, Ur 12/18/2024 Negative  Negative Final    Opiate Screen 12/18/2024 Negative  Negative Final    Amphetamine Screen, Urine 12/18/2024 Negative  Negative Final    Benzodiazepine Screen, Urine 12/18/2024 Negative  Negative Final    Tricyclic Antidepressants Screen 12/18/2024 Negative  Negative Final    Methadone Screen, Urine 12/18/2024 Negative  Negative Final    Barbiturates Screen, Urine 12/18/2024 Negative  Negative Final    Oxycodone Screen, Urine 12/18/2024 Negative  Negative Final    Buprenorphine, Screen, Urine 12/18/2024 Negative  Negative Final    Magnesium 12/18/2024 1.6  1.6 - 2.6 mg/dL Final    Fentanyl, Urine 12/18/2024 Negative  Negative Final    RBC, UA 12/18/2024 0-2  None Seen, 0-2 /HPF Final    WBC, UA 12/18/2024 3-5 (A)  None Seen, 0-2 /HPF Final    Bacteria, UA 12/18/2024 Trace (A)  None Seen /HPF Final    Squamous Epithelial Cells, UA 12/18/2024 0-2  None Seen, 0-2 /HPF Final    Hyaline Casts, UA 12/18/2024 None Seen  None Seen /LPF Final    Methodology 12/18/2024 Manual Light Microscopy   Final   Admission on 10/23/2024, Discharged on 10/23/2024   Component Date Value Ref Range Status    Glucose 10/23/2024 91  65 - 99 mg/dL Final    BUN 10/23/2024 6  6 - 20 mg/dL Final    Creatinine 10/23/2024 0.52 (L)  0.57 - 1.00 mg/dL Final     Sodium 10/23/2024 137  136 - 145 mmol/L Final    Potassium 10/23/2024 3.9  3.5 - 5.2 mmol/L Final    Chloride 10/23/2024 99  98 - 107 mmol/L Final    CO2 10/23/2024 19.0 (L)  22.0 - 29.0 mmol/L Final    Calcium 10/23/2024 9.1  8.6 - 10.5 mg/dL Final    Total Protein 10/23/2024 7.6  6.0 - 8.5 g/dL Final    Albumin 10/23/2024 4.3  3.5 - 5.2 g/dL Final    ALT (SGPT) 10/23/2024 43 (H)  1 - 33 U/L Final    AST (SGOT) 10/23/2024 81 (H)  1 - 32 U/L Final    Alkaline Phosphatase 10/23/2024 71  39 - 117 U/L Final    Total Bilirubin 10/23/2024 0.6  0.0 - 1.2 mg/dL Final    Globulin 10/23/2024 3.3  gm/dL Final    A/G Ratio 10/23/2024 1.3  g/dL Final    BUN/Creatinine Ratio 10/23/2024 11.5  7.0 - 25.0 Final    Anion Gap 10/23/2024 19.0 (H)  5.0 - 15.0 mmol/L Final    eGFR 10/23/2024 121.4  >60.0 mL/min/1.73 Final    Color, UA 10/23/2024 Yellow  Yellow, Straw Final    Appearance, UA 10/23/2024 Clear  Clear Final    pH, UA 10/23/2024 8.5 (H)  5.0 - 8.0 Final    Specific Gravity, UA 10/23/2024 1.016  1.005 - 1.030 Final    Glucose, UA 10/23/2024 Negative  Negative Final    Ketones, UA 10/23/2024 15 mg/dL (1+) (A)  Negative Final    Bilirubin, UA 10/23/2024 Negative  Negative Final    Blood, UA 10/23/2024 Negative  Negative Final    Protein, UA 10/23/2024 Trace (A)  Negative Final    Leuk Esterase, UA 10/23/2024 Small (1+) (A)  Negative Final    Nitrite, UA 10/23/2024 Negative  Negative Final    Urobilinogen, UA 10/23/2024 0.2 E.U./dL  0.2 - 1.0 E.U./dL Final    HCG Qualitative 10/23/2024 Negative  Negative Final    THC, Screen, Urine 10/23/2024 Negative  Negative Final    Phencyclidine (PCP), Urine 10/23/2024 Negative  Negative Final    Cocaine Screen, Urine 10/23/2024 Negative  Negative Final    Methamphetamine, Ur 10/23/2024 Negative  Negative Final    Opiate Screen 10/23/2024 Negative  Negative Final    Amphetamine Screen, Urine 10/23/2024 Negative  Negative Final    Benzodiazepine Screen, Urine 10/23/2024 Negative  Negative  Final    Tricyclic Antidepressants Screen 10/23/2024 Negative  Negative Final    Methadone Screen, Urine 10/23/2024 Negative  Negative Final    Barbiturates Screen, Urine 10/23/2024 Negative  Negative Final    Oxycodone Screen, Urine 10/23/2024 Negative  Negative Final    Buprenorphine, Screen, Urine 10/23/2024 Negative  Negative Final    Ethanol 10/23/2024 39 (H)  0 - 10 mg/dL Final    Ethanol % 10/23/2024 0.039  % Final    Extra Tube 10/23/2024 Hold for add-ons.   Final    Auto resulted.    Extra Tube 10/23/2024 hold for add-on   Final    Auto resulted    Extra Tube 10/23/2024 Hold for add-ons.   Final    Auto resulted    WBC 10/23/2024 5.19  3.40 - 10.80 10*3/mm3 Final    RBC 10/23/2024 3.70 (L)  3.77 - 5.28 10*6/mm3 Final    Hemoglobin 10/23/2024 13.3  12.0 - 15.9 g/dL Final    Hematocrit 10/23/2024 39.1  34.0 - 46.6 % Final    MCV 10/23/2024 105.7 (H)  79.0 - 97.0 fL Final    MCH 10/23/2024 35.9 (H)  26.6 - 33.0 pg Final    MCHC 10/23/2024 34.0  31.5 - 35.7 g/dL Final    RDW 10/23/2024 13.0  12.3 - 15.4 % Final    RDW-SD 10/23/2024 50.6  37.0 - 54.0 fl Final    MPV 10/23/2024 9.0  6.0 - 12.0 fL Final    Platelets 10/23/2024 304  140 - 450 10*3/mm3 Final    Neutrophil % 10/23/2024 65.8  42.7 - 76.0 % Final    Lymphocyte % 10/23/2024 27.6  19.6 - 45.3 % Final    Monocyte % 10/23/2024 5.6  5.0 - 12.0 % Final    Eosinophil % 10/23/2024 0.2 (L)  0.3 - 6.2 % Final    Basophil % 10/23/2024 0.6  0.0 - 1.5 % Final    Immature Grans % 10/23/2024 0.2  0.0 - 0.5 % Final    Neutrophils, Absolute 10/23/2024 3.42  1.70 - 7.00 10*3/mm3 Final    Lymphocytes, Absolute 10/23/2024 1.43  0.70 - 3.10 10*3/mm3 Final    Monocytes, Absolute 10/23/2024 0.29  0.10 - 0.90 10*3/mm3 Final    Eosinophils, Absolute 10/23/2024 0.01  0.00 - 0.40 10*3/mm3 Final    Basophils, Absolute 10/23/2024 0.03  0.00 - 0.20 10*3/mm3 Final    Immature Grans, Absolute 10/23/2024 0.01  0.00 - 0.05 10*3/mm3 Final    nRBC 10/23/2024 0.0  0.0 - 0.2 /100 WBC Final     Fentanyl, Urine 10/23/2024 Negative  Negative Final    RBC, UA 10/23/2024 0-2  None Seen, 0-2 /HPF Final    WBC, UA 10/23/2024 11-20 (A)  None Seen, 0-2 /HPF Final    Bacteria, UA 10/23/2024 3+ (A)  None Seen /HPF Final    Squamous Epithelial Cells, UA 10/23/2024 3-6 (A)  None Seen, 0-2 /HPF Final    Hyaline Casts, UA 10/23/2024 None Seen  None Seen /LPF Final    Methodology 10/23/2024 Automated Microscopy   Final    Extra Tube 10/23/2024 Hold for add-ons.   Final    Auto resulted.    QT Interval 10/23/2024 358  ms Final    QTC Interval 10/23/2024 433  ms Final         Assessment & Plan   Diagnoses and all orders for this visit:    1. Medication management (Primary)  -     POC Medline 14 Panel Urine Drug Screen    2. Generalized anxiety disorder  -     LORazepam (Ativan) 1 MG tablet; Take 1 tablet by mouth 2 (Two) Times a Day As Needed for Anxiety.  Dispense: 30 tablet; Refill: 0    Other orders  -     escitalopram (LEXAPRO) 20 MG tablet; Take 1 tablet by mouth Daily.  Dispense: 30 tablet; Refill: 1        Visit Diagnoses:    ICD-10-CM ICD-9-CM   1. Medication management  Z79.899 V58.69   2. Generalized anxiety disorder  F41.1 300.02       GOALS:  Short Term Goals: Patient will be compliant with medication, and patient will have no significant medication related side effects.  Patient will be engaged in psychotherapy as indicated.  Patient will report subjective improvement of symptoms.  Long term goals: To stabilize mood and treat/improve subjective symptoms, the patient will stay out of the hospital, the patient will be at an optimal level of functioning, and the patient will take all medications as prescribed.  The patient/guardian verbalized understanding and agreement with goals that were mutually set.      TREATMENT PLAN: Continue supportive psychotherapy efforts and medications as indicated.  Pharmacological and Non-Pharmacological treatment options discussed during today's visit. Patient/Guardian  acknowledged and verbally consented with current treatment plan and was educated on the importance of compliance with treatment and follow-up appointments.      MEDICATION ISSUES:  Discussed medication options and treatment plan of prescribed medication as well as the risks, benefits, any black box warnings, and side effects including potential falls, possible impaired driving, and metabolic adversities among others. Patient is agreeable to call the office with any worsening of symptoms or onset of side effects, or if any concerns or questions arise.  The contact information for the office is made available to the patient. Patient is agreeable to call 911 or go to the nearest ER should they begin having any SI/HI, or if any urgent concerns arise. No medication side effects or related complaints today.     MEDS ORDERED DURING VISIT:  New Medications Ordered This Visit   Medications    escitalopram (LEXAPRO) 20 MG tablet     Sig: Take 1 tablet by mouth Daily.     Dispense:  30 tablet     Refill:  1    LORazepam (Ativan) 1 MG tablet     Sig: Take 1 tablet by mouth 2 (Two) Times a Day As Needed for Anxiety.     Dispense:  30 tablet     Refill:  0     Plan:  - Increase Lexapro to 20 mg by mouth daily for anxiety.  - start Ativan 1 mg by mouth up to 2 times daily.  Had a lengthy discussion with patient in regards to this medication.  Patient is aware that this medication is potentially addictive and can lead to tolerance, dependence, and she may exhibit withdrawal symptoms upon cessation.  Patient verbalizes understanding that this medication is meant for short-term use while finding another medication that may work to prevent panic attacks.  -Christiano reviewed and is appropriate at this time.  - Urine drug screen testing negative for all substances tested at this time.  -Patient verbalizes understanding to go to nearest ED if SI/HI develop.  Follow Up Appointment:   Return in about 4 weeks (around 2/18/2025) for  Recheck.             This document has been electronically signed by PARAMJIT Tripp  January 21, 2025 15:57 EST    Dictated Utilizing Dragon Dictation: Part of this note may be an electronic transcription/translation of spoken language to printed text using the Dragon Dictation System.

## 2025-02-18 ENCOUNTER — OFFICE VISIT (OUTPATIENT)
Dept: PSYCHIATRY | Facility: CLINIC | Age: 40
End: 2025-02-18
Payer: MEDICAID

## 2025-02-18 VITALS
WEIGHT: 166.6 LBS | DIASTOLIC BLOOD PRESSURE: 84 MMHG | OXYGEN SATURATION: 98 % | HEART RATE: 86 BPM | SYSTOLIC BLOOD PRESSURE: 140 MMHG | HEIGHT: 69 IN | BODY MASS INDEX: 24.68 KG/M2

## 2025-02-18 DIAGNOSIS — F41.1 GENERALIZED ANXIETY DISORDER: ICD-10-CM

## 2025-02-18 DIAGNOSIS — F41.0 PANIC DISORDER: Primary | ICD-10-CM

## 2025-02-18 RX ORDER — LORAZEPAM 1 MG/1
1 TABLET ORAL 2 TIMES DAILY PRN
Qty: 60 TABLET | Refills: 0 | Status: SHIPPED | OUTPATIENT
Start: 2025-02-18

## 2025-02-18 RX ORDER — ESCITALOPRAM OXALATE 20 MG/1
20 TABLET ORAL DAILY
Qty: 30 TABLET | Refills: 2 | Status: SHIPPED | OUTPATIENT
Start: 2025-02-18

## 2025-02-18 NOTE — PROGRESS NOTES
Subjective     Coco Mahoney is a 39 y.o. female who presents today for follow up    Chief Complaint: anxiety       History of Present Illness:    History of Present Illness  Coco is a 39-year-old female who presents today for a follow-up visit with me.  She tells me that she has been able to tell a difference with the Lexapro still having a lot of situational stress and anxiety.  She is still having difficulty when it comes to driving and stays very tense and nervous.  Sleep has been poor and more than half the days she is waking prematurely, unable to fall back to sleep. She states that the Ativan does help her sleep better at night. Appetite is okay.  Denies having any side effects of the medications.       The following portions of the patient's history were reviewed and updated as appropriate: allergies, current medications, past family history, past medical history, past social history, past surgical history and problem list.    Past Psychiatric History:  Began Treatment: Early 20's   Diagnoses:Anxiety and panic disorder  Psychiatrist: PARAMJIT Samuel  Therapist: as a child  Admission History:Denies  Medication Trials: Lexapro, Celexa, propranolol, Wellbutrin (more depression), Ativan, Xanax , hydroxyzine, buspar, paxil, Elavil, klonopin,   Self Harm: Denies  Suicide Attempts:Denies   Psychosis, Anxiety, Depression: Denies    Past Medical History:  Past Medical History:   Diagnosis Date    Anxiety     Migraine headache     Nongonococcal urethritis due to chlamydia trachomatis     Syncope        Substance Abuse History:   Prescribed benzo's.    Social History:  Social History     Socioeconomic History    Marital status:    Tobacco Use    Smoking status: Former    Smokeless tobacco: Never    Tobacco comments:     Half a pack a day for 5 years.   Vaping Use    Vaping status: Never Used   Substance and Sexual Activity    Alcohol use: Yes     Comment: Social use    Drug use: No    Sexual  activity: Yes     Partners: Male       Family History:  Family History   Problem Relation Age of Onset    Migraines Mother     Atrial fibrillation Father     Diabetes Father     Heart disease Father     Hypertension Brother     Breast cancer Paternal Grandmother        Past Surgical History:  Past Surgical History:   Procedure Laterality Date     SECTION      DILATATION AND CURETTAGE      INGUINAL HERNIA REPAIR         Problem List:  Patient Active Problem List   Diagnosis    Didelphic uterus    Generalized anxiety disorder    Panic disorder    Migraine with aura and without status migrainosus, not intractable       Allergy:   No Known Allergies     Current Medications:   Current Outpatient Medications   Medication Sig Dispense Refill    escitalopram (LEXAPRO) 20 MG tablet Take 1 tablet by mouth Daily. 30 tablet 2    LORazepam (Ativan) 1 MG tablet Take 1 tablet by mouth 2 (Two) Times a Day As Needed for Anxiety. 60 tablet 0    multivitamin with minerals tablet tablet Take 1 tablet by mouth Daily.       No current facility-administered medications for this visit.       Review of Systems:    Review of Systems   Constitutional:  Positive for fatigue.   Psychiatric/Behavioral:  Positive for sleep disturbance. The patient is nervous/anxious.          Physical Exam:   Physical Exam  Vitals reviewed.   Constitutional:       Appearance: Normal appearance.   Musculoskeletal:      Cervical back: Normal range of motion.   Neurological:      Mental Status: She is alert and oriented to person, place, and time. Mental status is at baseline.   Psychiatric:         Attention and Perception: Attention and perception normal.         Mood and Affect: Affect normal. Mood is anxious.         Speech: Speech normal.         Behavior: Behavior normal. Behavior is cooperative.         Thought Content: Thought content normal.         Cognition and Memory: Cognition and memory normal.         Judgment: Judgment normal.  "        Vitals:  Blood pressure 140/84, pulse 86, height 175.3 cm (69.02\"), weight 75.6 kg (166 lb 9.6 oz), SpO2 98%.   Body mass index is 24.59 kg/m².    Last 3 Blood Pressure Readings:  BP Readings from Last 3 Encounters:   02/18/25 140/84   01/21/25 118/78   12/18/24 139/90     Mental Status Exam:   Hygiene:   good  Cooperation:  Cooperative  Eye Contact:  Good  Psychomotor Behavior:  Restless  Affect:  Full range  Mood: normal and anxious  Hopelessness: Denies  Speech:  Normal  Thought Process:  Linear  Thought Content:  Normal  Suicidal:  None  Homicidal:  None  Hallucinations:  None  Delusion:  None  Memory:  Intact  Orientation:  Person, Place, Time, and Situation  Reliability:  good  Insight:  Fair  Judgement:  Fair  Impulse Control:  Fair  Physical/Medical Issues:  Yes see pmh      Lab Results:   Office Visit on 01/21/2025   Component Date Value Ref Range Status    Amphetamine Screen, Urine 01/21/2025 Negative  Negative Final    Referto confirmation by [facility] in scanned documents    Barbiturates Screen, Urine 01/21/2025 Negative  Negative Final    Buprenorphine, Screen, Urine 01/21/2025 Negative  Negative Final    Benzodiazepine Screen, Urine 01/21/2025 Negative  Negative Final    Cocaine Screen, Urine 01/21/2025 Negative  Negative Final    MDMA (ECSTASY) 01/21/2025 Negative  Negative Final    Methamphetamine, Ur 01/21/2025 Negative  Negative Final    Methadone Screen, Urine 01/21/2025 Negative  Negative Final    Morphine/Opiates Screen, Urine 01/21/2025 Negative  Negative Final    Oxycodone Screen, Urine 01/21/2025 Negative  Negative Final    Phencyclidine (PCP), Urine 01/21/2025 Negative  Negative Final    Propoxyphene Scree, Urine 01/21/2025 Negative  Negative Final    Tricyclic Antidepressants Screen 01/21/2025 Negative  Negative Final    THC, Screen, Urine 01/21/2025 Negative  Negative Final   Admission on 12/18/2024, Discharged on 12/18/2024   Component Date Value Ref Range Status    QT Interval " 12/18/2024 360  ms Final    QTC Interval 12/18/2024 454  ms Final    Glucose 12/18/2024 101 (H)  65 - 99 mg/dL Final    BUN 12/18/2024 8  6 - 20 mg/dL Final    Creatinine 12/18/2024 0.59  0.57 - 1.00 mg/dL Final    Sodium 12/18/2024 136  136 - 145 mmol/L Final    Potassium 12/18/2024 3.0 (L)  3.5 - 5.2 mmol/L Final    Chloride 12/18/2024 98  98 - 107 mmol/L Final    CO2 12/18/2024 20.2 (L)  22.0 - 29.0 mmol/L Final    Calcium 12/18/2024 9.1  8.6 - 10.5 mg/dL Final    Total Protein 12/18/2024 7.2  6.0 - 8.5 g/dL Final    Albumin 12/18/2024 4.3  3.5 - 5.2 g/dL Final    ALT (SGPT) 12/18/2024 29  1 - 33 U/L Final    AST (SGOT) 12/18/2024 36 (H)  1 - 32 U/L Final    Alkaline Phosphatase 12/18/2024 65  39 - 117 U/L Final    Total Bilirubin 12/18/2024 0.3  0.0 - 1.2 mg/dL Final    Globulin 12/18/2024 2.9  gm/dL Final    A/G Ratio 12/18/2024 1.5  g/dL Final    BUN/Creatinine Ratio 12/18/2024 13.6  7.0 - 25.0 Final    Anion Gap 12/18/2024 17.8 (H)  5.0 - 15.0 mmol/L Final    eGFR 12/18/2024 117.7  >60.0 mL/min/1.73 Final    HS Troponin T 12/18/2024 <6  <14 ng/L Final    Extra Tube 12/18/2024 Hold for add-ons.   Final    Auto resulted.    Extra Tube 12/18/2024 hold for add-on   Final    Auto resulted    Extra Tube 12/18/2024 Hold for add-ons.   Final    Auto resulted.    Extra Tube 12/18/2024 Hold for add-ons.   Final    Auto resulted    WBC 12/18/2024 6.10  3.40 - 10.80 10*3/mm3 Final    RBC 12/18/2024 3.46 (L)  3.77 - 5.28 10*6/mm3 Final    Hemoglobin 12/18/2024 12.2  12.0 - 15.9 g/dL Final    Hematocrit 12/18/2024 35.2  34.0 - 46.6 % Final    MCV 12/18/2024 101.7 (H)  79.0 - 97.0 fL Final    MCH 12/18/2024 35.3 (H)  26.6 - 33.0 pg Final    MCHC 12/18/2024 34.7  31.5 - 35.7 g/dL Final    RDW 12/18/2024 12.3  12.3 - 15.4 % Final    RDW-SD 12/18/2024 45.9  37.0 - 54.0 fl Final    MPV 12/18/2024 9.2  6.0 - 12.0 fL Final    Platelets 12/18/2024 277  140 - 450 10*3/mm3 Final    Neutrophil % 12/18/2024 52.5  42.7 - 76.0 % Final     Lymphocyte % 12/18/2024 34.8  19.6 - 45.3 % Final    Monocyte % 12/18/2024 7.5  5.0 - 12.0 % Final    Eosinophil % 12/18/2024 4.1  0.3 - 6.2 % Final    Basophil % 12/18/2024 0.8  0.0 - 1.5 % Final    Immature Grans % 12/18/2024 0.3  0.0 - 0.5 % Final    Neutrophils, Absolute 12/18/2024 3.20  1.70 - 7.00 10*3/mm3 Final    Lymphocytes, Absolute 12/18/2024 2.12  0.70 - 3.10 10*3/mm3 Final    Monocytes, Absolute 12/18/2024 0.46  0.10 - 0.90 10*3/mm3 Final    Eosinophils, Absolute 12/18/2024 0.25  0.00 - 0.40 10*3/mm3 Final    Basophils, Absolute 12/18/2024 0.05  0.00 - 0.20 10*3/mm3 Final    Immature Grans, Absolute 12/18/2024 0.02  0.00 - 0.05 10*3/mm3 Final    nRBC 12/18/2024 0.0  0.0 - 0.2 /100 WBC Final    Color, UA 12/18/2024 Yellow  Yellow, Straw Final    Appearance, UA 12/18/2024 Clear  Clear Final    pH, UA 12/18/2024 6.5  5.0 - 8.0 Final    Specific Gravity, UA 12/18/2024 <=1.005  1.005 - 1.030 Final    Glucose, UA 12/18/2024 Negative  Negative Final    Ketones, UA 12/18/2024 Negative  Negative Final    Bilirubin, UA 12/18/2024 Negative  Negative Final    Blood, UA 12/18/2024 Negative  Negative Final    Protein, UA 12/18/2024 Negative  Negative Final    Leuk Esterase, UA 12/18/2024 Small (1+) (A)  Negative Final    Nitrite, UA 12/18/2024 Negative  Negative Final    Urobilinogen, UA 12/18/2024 0.2 E.U./dL  0.2 - 1.0 E.U./dL Final    THC, Screen, Urine 12/18/2024 Negative  Negative Final    Phencyclidine (PCP), Urine 12/18/2024 Negative  Negative Final    Cocaine Screen, Urine 12/18/2024 Negative  Negative Final    Methamphetamine, Ur 12/18/2024 Negative  Negative Final    Opiate Screen 12/18/2024 Negative  Negative Final    Amphetamine Screen, Urine 12/18/2024 Negative  Negative Final    Benzodiazepine Screen, Urine 12/18/2024 Negative  Negative Final    Tricyclic Antidepressants Screen 12/18/2024 Negative  Negative Final    Methadone Screen, Urine 12/18/2024 Negative  Negative Final    Barbiturates Screen,  Urine 12/18/2024 Negative  Negative Final    Oxycodone Screen, Urine 12/18/2024 Negative  Negative Final    Buprenorphine, Screen, Urine 12/18/2024 Negative  Negative Final    Magnesium 12/18/2024 1.6  1.6 - 2.6 mg/dL Final    Fentanyl, Urine 12/18/2024 Negative  Negative Final    RBC, UA 12/18/2024 0-2  None Seen, 0-2 /HPF Final    WBC, UA 12/18/2024 3-5 (A)  None Seen, 0-2 /HPF Final    Bacteria, UA 12/18/2024 Trace (A)  None Seen /HPF Final    Squamous Epithelial Cells, UA 12/18/2024 0-2  None Seen, 0-2 /HPF Final    Hyaline Casts, UA 12/18/2024 None Seen  None Seen /LPF Final    Methodology 12/18/2024 Manual Light Microscopy   Final         Assessment & Plan   Diagnoses and all orders for this visit:    1. Panic disorder (Primary)    2. Generalized anxiety disorder  -     LORazepam (Ativan) 1 MG tablet; Take 1 tablet by mouth 2 (Two) Times a Day As Needed for Anxiety.  Dispense: 60 tablet; Refill: 0    Other orders  -     escitalopram (LEXAPRO) 20 MG tablet; Take 1 tablet by mouth Daily.  Dispense: 30 tablet; Refill: 2          Visit Diagnoses:    ICD-10-CM ICD-9-CM   1. Panic disorder  F41.0 300.01   2. Generalized anxiety disorder  F41.1 300.02         GOALS:  Short Term Goals: Patient will be compliant with medication, and patient will have no significant medication related side effects.  Patient will be engaged in psychotherapy as indicated.  Patient will report subjective improvement of symptoms.  Long term goals: To stabilize mood and treat/improve subjective symptoms, the patient will stay out of the hospital, the patient will be at an optimal level of functioning, and the patient will take all medications as prescribed.  The patient/guardian verbalized understanding and agreement with goals that were mutually set.      TREATMENT PLAN: Continue supportive psychotherapy efforts and medications as indicated.  Pharmacological and Non-Pharmacological treatment options discussed during today's visit.  Patient/Guardian acknowledged and verbally consented with current treatment plan and was educated on the importance of compliance with treatment and follow-up appointments.      MEDICATION ISSUES:  Discussed medication options and treatment plan of prescribed medication as well as the risks, benefits, any black box warnings, and side effects including potential falls, possible impaired driving, and metabolic adversities among others. Patient is agreeable to call the office with any worsening of symptoms or onset of side effects, or if any concerns or questions arise.  The contact information for the office is made available to the patient. Patient is agreeable to call 911 or go to the nearest ER should they begin having any SI/HI, or if any urgent concerns arise. No medication side effects or related complaints today.     MEDS ORDERED DURING VISIT:  New Medications Ordered This Visit   Medications    LORazepam (Ativan) 1 MG tablet     Sig: Take 1 tablet by mouth 2 (Two) Times a Day As Needed for Anxiety.     Dispense:  60 tablet     Refill:  0    escitalopram (LEXAPRO) 20 MG tablet     Sig: Take 1 tablet by mouth Daily.     Dispense:  30 tablet     Refill:  2     Plan:  - Continue Lexapro 20 mg by mouth daily for anxiety.  - start Ativan 1 mg by mouth up to 2 times daily.  Had a lengthy discussion with patient in regards to this medication.  Patient is aware that this medication is potentially addictive and can lead to tolerance, dependence, and she may exhibit withdrawal symptoms upon cessation.  Patient verbalizes understanding that this medication is meant for short-term use while finding another medication that may work to prevent panic attacks.  -Christiano reviewed and is appropriate at this time.  - Urine drug screen testing negative for all substances tested at this time.  -Patient verbalizes understanding to go to nearest ED if SI/HI develop.  Follow Up Appointment:   Return in about 2 months (around  4/18/2025).             This document has been electronically signed by PARAMJIT Tripp  February 18, 2025 16:33 EST    Dictated Utilizing Dragon Dictation: Part of this note may be an electronic transcription/translation of spoken language to printed text using the Dragon Dictation System.

## 2025-03-18 DIAGNOSIS — F41.1 GENERALIZED ANXIETY DISORDER: ICD-10-CM

## 2025-03-18 RX ORDER — LORAZEPAM 1 MG/1
1 TABLET ORAL 2 TIMES DAILY PRN
Qty: 60 TABLET | Refills: 0 | Status: SHIPPED | OUTPATIENT
Start: 2025-03-18

## 2025-04-15 ENCOUNTER — OFFICE VISIT (OUTPATIENT)
Dept: PSYCHIATRY | Facility: CLINIC | Age: 40
End: 2025-04-15
Payer: MEDICAID

## 2025-04-15 VITALS
HEIGHT: 69 IN | WEIGHT: 165.8 LBS | HEART RATE: 105 BPM | DIASTOLIC BLOOD PRESSURE: 80 MMHG | BODY MASS INDEX: 24.56 KG/M2 | OXYGEN SATURATION: 97 % | SYSTOLIC BLOOD PRESSURE: 126 MMHG

## 2025-04-15 DIAGNOSIS — F41.0 PANIC DISORDER: Primary | ICD-10-CM

## 2025-04-15 DIAGNOSIS — F41.1 GENERALIZED ANXIETY DISORDER: ICD-10-CM

## 2025-04-15 RX ORDER — LORAZEPAM 1 MG/1
1 TABLET ORAL 2 TIMES DAILY PRN
Qty: 60 TABLET | Refills: 0 | Status: SHIPPED | OUTPATIENT
Start: 2025-04-15

## 2025-04-15 RX ORDER — ESCITALOPRAM OXALATE 20 MG/1
20 TABLET ORAL DAILY
Qty: 30 TABLET | Refills: 2 | Status: SHIPPED | OUTPATIENT
Start: 2025-04-15

## 2025-04-15 NOTE — PROGRESS NOTES
Subjective     Coco Mahoney is a 39 y.o. female who presents today for follow up    Chief Complaint: anxiety       History of Present Illness:    History of Present Illness  Coco is a 39-year-old female who presents today for a follow-up visit with me.  She states that she has been doing extremely well over the past month and feels like the Lexapro has really started to work well.  She has had to take approximately half of her previous prescription of Ativan.  States that this is very effective when she feels like she does need to take it.  She is sleeping well at night but does have some fatigue during the day and frequently takes naps.  Appetite is good.  Denies any side effects of the medications.  No issues with depression at this time.  No SI/HI/AVH.         The following portions of the patient's history were reviewed and updated as appropriate: allergies, current medications, past family history, past medical history, past social history, past surgical history and problem list.    Past Psychiatric History:  Began Treatment: Early 20's   Diagnoses:Anxiety and panic disorder  Psychiatrist: PARAMJIT Samuel  Therapist: as a child  Admission History:Denies  Medication Trials: Lexapro, Celexa, propranolol, Wellbutrin (more depression), Ativan, Xanax , hydroxyzine, buspar, paxil, Elavil, klonopin,   Self Harm: Denies  Suicide Attempts:Denies   Psychosis, Anxiety, Depression: Denies    Past Medical History:  Past Medical History:   Diagnosis Date    Anxiety     Migraine headache     Nongonococcal urethritis due to chlamydia trachomatis     Panic disorder     Syncope        Substance Abuse History:   Prescribed benzo's.    Social History:  Social History     Socioeconomic History    Marital status:    Tobacco Use    Smoking status: Former    Smokeless tobacco: Never    Tobacco comments:     Half a pack a day for 5 years.   Vaping Use    Vaping status: Never Used   Substance and Sexual  Activity    Alcohol use: Yes     Comment: Social use    Drug use: No    Sexual activity: Yes     Partners: Male       Family History:  Family History   Problem Relation Age of Onset    Migraines Mother     Atrial fibrillation Father     Diabetes Father     Heart disease Father     Hypertension Brother     Breast cancer Paternal Grandmother        Past Surgical History:  Past Surgical History:   Procedure Laterality Date     SECTION      DILATATION AND CURETTAGE      HERNIA REPAIR      INGUINAL HERNIA REPAIR         Problem List:  Patient Active Problem List   Diagnosis    Didelphic uterus    Generalized anxiety disorder    Panic disorder    Migraine with aura and without status migrainosus, not intractable       Allergy:   No Known Allergies     Current Medications:   Current Outpatient Medications   Medication Sig Dispense Refill    escitalopram (LEXAPRO) 20 MG tablet Take 1 tablet by mouth Daily. 30 tablet 2    LORazepam (Ativan) 1 MG tablet Take 1 tablet by mouth 2 (Two) Times a Day As Needed for Anxiety. 60 tablet 0    multivitamin with minerals tablet tablet Take 1 tablet by mouth Daily.       No current facility-administered medications for this visit.       Review of Systems:    Review of Systems   Constitutional:  Positive for fatigue.   Psychiatric/Behavioral:  Negative for decreased concentration, sleep disturbance and depressed mood. The patient is not nervous/anxious.          Physical Exam:   Physical Exam  Vitals reviewed.   Constitutional:       Appearance: Normal appearance.   Musculoskeletal:      Cervical back: Normal range of motion.   Neurological:      Mental Status: She is alert and oriented to person, place, and time. Mental status is at baseline.   Psychiatric:         Attention and Perception: Attention and perception normal.         Mood and Affect: Mood and affect normal. Mood is not anxious.         Speech: Speech normal.         Behavior: Behavior normal. Behavior is cooperative.  "        Thought Content: Thought content normal.         Cognition and Memory: Cognition and memory normal.         Judgment: Judgment normal.         Vitals:  Blood pressure 126/80, pulse 105, height 175.3 cm (69.02\"), weight 75.2 kg (165 lb 12.8 oz), SpO2 97%.   Body mass index is 24.47 kg/m².    Last 3 Blood Pressure Readings:  BP Readings from Last 3 Encounters:   04/15/25 126/80   02/18/25 140/84   01/21/25 118/78     Mental Status Exam:   Hygiene:   good  Cooperation:  Cooperative  Eye Contact:  Good  Psychomotor Behavior:  Restless  Affect:  Full range  Mood: normal and anxious  Hopelessness: Denies  Speech:  Normal  Thought Process:  Linear  Thought Content:  Normal  Suicidal:  None  Homicidal:  None  Hallucinations:  None  Delusion:  None  Memory:  Intact  Orientation:  Person, Place, Time, and Situation  Reliability:  good  Insight:  Fair  Judgement:  Fair  Impulse Control:  Fair  Physical/Medical Issues:  Yes see pmh      Lab Results:   Office Visit on 01/21/2025   Component Date Value Ref Range Status    Amphetamine Screen, Urine 01/21/2025 Negative  Negative Final    Referto confirmation by [facility] in scanned documents    Barbiturates Screen, Urine 01/21/2025 Negative  Negative Final    Buprenorphine, Screen, Urine 01/21/2025 Negative  Negative Final    Benzodiazepine Screen, Urine 01/21/2025 Negative  Negative Final    Cocaine Screen, Urine 01/21/2025 Negative  Negative Final    MDMA (ECSTASY) 01/21/2025 Negative  Negative Final    Methamphetamine, Ur 01/21/2025 Negative  Negative Final    Methadone Screen, Urine 01/21/2025 Negative  Negative Final    Morphine/Opiates Screen, Urine 01/21/2025 Negative  Negative Final    Oxycodone Screen, Urine 01/21/2025 Negative  Negative Final    Phencyclidine (PCP), Urine 01/21/2025 Negative  Negative Final    Propoxyphene Scree, Urine 01/21/2025 Negative  Negative Final    Tricyclic Antidepressants Screen 01/21/2025 Negative  Negative Final    THC, Screen, Urine " 01/21/2025 Negative  Negative Final         Assessment & Plan   Diagnoses and all orders for this visit:    1. Panic disorder (Primary)    2. Generalized anxiety disorder  -     LORazepam (Ativan) 1 MG tablet; Take 1 tablet by mouth 2 (Two) Times a Day As Needed for Anxiety.  Dispense: 60 tablet; Refill: 0    Other orders  -     escitalopram (LEXAPRO) 20 MG tablet; Take 1 tablet by mouth Daily.  Dispense: 30 tablet; Refill: 2            Visit Diagnoses:    ICD-10-CM ICD-9-CM   1. Panic disorder  F41.0 300.01   2. Generalized anxiety disorder  F41.1 300.02           GOALS:  Short Term Goals: Patient will be compliant with medication, and patient will have no significant medication related side effects.  Patient will be engaged in psychotherapy as indicated.  Patient will report subjective improvement of symptoms.  Long term goals: To stabilize mood and treat/improve subjective symptoms, the patient will stay out of the hospital, the patient will be at an optimal level of functioning, and the patient will take all medications as prescribed.  The patient/guardian verbalized understanding and agreement with goals that were mutually set.      TREATMENT PLAN: Continue supportive psychotherapy efforts and medications as indicated.  Pharmacological and Non-Pharmacological treatment options discussed during today's visit. Patient/Guardian acknowledged and verbally consented with current treatment plan and was educated on the importance of compliance with treatment and follow-up appointments.      MEDICATION ISSUES:  Discussed medication options and treatment plan of prescribed medication as well as the risks, benefits, any black box warnings, and side effects including potential falls, possible impaired driving, and metabolic adversities among others. Patient is agreeable to call the office with any worsening of symptoms or onset of side effects, or if any concerns or questions arise.  The contact information for the office  is made available to the patient. Patient is agreeable to call 911 or go to the nearest ER should they begin having any SI/HI, or if any urgent concerns arise. No medication side effects or related complaints today.     MEDS ORDERED DURING VISIT:  New Medications Ordered This Visit   Medications    escitalopram (LEXAPRO) 20 MG tablet     Sig: Take 1 tablet by mouth Daily.     Dispense:  30 tablet     Refill:  2    LORazepam (Ativan) 1 MG tablet     Sig: Take 1 tablet by mouth 2 (Two) Times a Day As Needed for Anxiety.     Dispense:  60 tablet     Refill:  0     Plan:  - Continue Lexapro 20 mg by mouth daily for anxiety.  - Continue Ativan 1 mg by mouth up to 2 times daily.  Had a lengthy discussion with patient in regards to this medication.  Patient is aware that this medication is potentially addictive and can lead to tolerance, dependence, and she may exhibit withdrawal symptoms upon cessation.  Patient verbalizes understanding that this medication is meant for short-term use while finding another medication that may work to prevent panic attacks.  -Christiano reviewed and is appropriate at this time.  - Urine drug screen testing negative for all substances tested at this time.  -Patient verbalizes understanding to go to nearest ED if SI/HI develop.  Follow Up Appointment:   Return in about 2 months (around 6/15/2025).             This document has been electronically signed by PARAMJIT Tripp  April 16, 2025 08:29 EDT    Dictated Utilizing Dragon Dictation: Part of this note may be an electronic transcription/translation of spoken language to printed text using the Dragon Dictation System.

## 2025-05-19 DIAGNOSIS — F41.1 GENERALIZED ANXIETY DISORDER: ICD-10-CM

## 2025-05-20 RX ORDER — LORAZEPAM 1 MG/1
1 TABLET ORAL 2 TIMES DAILY PRN
Qty: 60 TABLET | Refills: 0 | Status: SHIPPED | OUTPATIENT
Start: 2025-05-20

## 2025-06-20 DIAGNOSIS — F41.1 GENERALIZED ANXIETY DISORDER: ICD-10-CM

## 2025-06-25 RX ORDER — LORAZEPAM 1 MG/1
1 TABLET ORAL EVERY 12 HOURS SCHEDULED
Qty: 60 TABLET | Refills: 2 | Status: SHIPPED | OUTPATIENT
Start: 2025-06-25

## 2025-07-17 ENCOUNTER — OFFICE VISIT (OUTPATIENT)
Dept: PSYCHIATRY | Facility: CLINIC | Age: 40
End: 2025-07-17
Payer: MEDICAID

## 2025-07-17 VITALS
SYSTOLIC BLOOD PRESSURE: 128 MMHG | DIASTOLIC BLOOD PRESSURE: 88 MMHG | WEIGHT: 168 LBS | BODY MASS INDEX: 24.88 KG/M2 | HEART RATE: 78 BPM | OXYGEN SATURATION: 99 % | HEIGHT: 69 IN

## 2025-07-17 DIAGNOSIS — F41.1 GENERALIZED ANXIETY DISORDER: ICD-10-CM

## 2025-07-17 DIAGNOSIS — F41.0 PANIC DISORDER: Primary | ICD-10-CM

## 2025-07-17 RX ORDER — ESCITALOPRAM OXALATE 20 MG/1
20 TABLET ORAL DAILY
Qty: 30 TABLET | Refills: 2 | Status: SHIPPED | OUTPATIENT
Start: 2025-07-17